# Patient Record
Sex: FEMALE
[De-identification: names, ages, dates, MRNs, and addresses within clinical notes are randomized per-mention and may not be internally consistent; named-entity substitution may affect disease eponyms.]

---

## 2018-09-09 ENCOUNTER — RX ONLY (OUTPATIENT)
Age: 63
Setting detail: RX ONLY
End: 2018-09-09

## 2019-08-20 ENCOUNTER — OFFICE VISIT (OUTPATIENT)
Dept: FAMILY MEDICINE CLINIC | Age: 64
End: 2019-08-20
Payer: COMMERCIAL

## 2019-08-20 VITALS
BODY MASS INDEX: 20.62 KG/M2 | OXYGEN SATURATION: 97 % | HEIGHT: 60 IN | WEIGHT: 105 LBS | DIASTOLIC BLOOD PRESSURE: 60 MMHG | HEART RATE: 110 BPM | SYSTOLIC BLOOD PRESSURE: 136 MMHG

## 2019-08-20 DIAGNOSIS — Z12.39 SCREENING FOR BREAST CANCER: ICD-10-CM

## 2019-08-20 DIAGNOSIS — I10 ESSENTIAL HYPERTENSION: Primary | ICD-10-CM

## 2019-08-20 PROCEDURE — 1123F ACP DISCUSS/DSCN MKR DOCD: CPT | Performed by: FAMILY MEDICINE

## 2019-08-20 PROCEDURE — G8427 DOCREV CUR MEDS BY ELIG CLIN: HCPCS | Performed by: FAMILY MEDICINE

## 2019-08-20 PROCEDURE — 1090F PRES/ABSN URINE INCON ASSESS: CPT | Performed by: FAMILY MEDICINE

## 2019-08-20 PROCEDURE — G8420 CALC BMI NORM PARAMETERS: HCPCS | Performed by: FAMILY MEDICINE

## 2019-08-20 PROCEDURE — G8400 PT W/DXA NO RESULTS DOC: HCPCS | Performed by: FAMILY MEDICINE

## 2019-08-20 PROCEDURE — 4040F PNEUMOC VAC/ADMIN/RCVD: CPT | Performed by: FAMILY MEDICINE

## 2019-08-20 PROCEDURE — 99203 OFFICE O/P NEW LOW 30 MIN: CPT | Performed by: FAMILY MEDICINE

## 2019-08-20 PROCEDURE — 3017F COLORECTAL CA SCREEN DOC REV: CPT | Performed by: FAMILY MEDICINE

## 2019-08-20 PROCEDURE — 1036F TOBACCO NON-USER: CPT | Performed by: FAMILY MEDICINE

## 2019-08-20 RX ORDER — AMLODIPINE BESYLATE 5 MG/1
TABLET ORAL
Refills: 2 | COMMUNITY
Start: 2019-08-12 | End: 2020-01-21 | Stop reason: SDUPTHER

## 2019-08-20 ASSESSMENT — ENCOUNTER SYMPTOMS
DIARRHEA: 0
NAUSEA: 0
CONSTIPATION: 0
VOMITING: 0
EYE REDNESS: 0
SHORTNESS OF BREATH: 0

## 2019-08-20 ASSESSMENT — PATIENT HEALTH QUESTIONNAIRE - PHQ9
2. FEELING DOWN, DEPRESSED OR HOPELESS: 0
SUM OF ALL RESPONSES TO PHQ9 QUESTIONS 1 & 2: 0
1. LITTLE INTEREST OR PLEASURE IN DOING THINGS: 0
SUM OF ALL RESPONSES TO PHQ QUESTIONS 1-9: 0
SUM OF ALL RESPONSES TO PHQ QUESTIONS 1-9: 0

## 2020-01-21 ENCOUNTER — OFFICE VISIT (OUTPATIENT)
Dept: FAMILY MEDICINE CLINIC | Age: 65
End: 2020-01-21
Payer: COMMERCIAL

## 2020-01-21 VITALS
HEIGHT: 60 IN | WEIGHT: 109 LBS | DIASTOLIC BLOOD PRESSURE: 84 MMHG | SYSTOLIC BLOOD PRESSURE: 164 MMHG | BODY MASS INDEX: 21.4 KG/M2 | HEART RATE: 101 BPM | OXYGEN SATURATION: 95 %

## 2020-01-21 PROCEDURE — 4040F PNEUMOC VAC/ADMIN/RCVD: CPT | Performed by: NURSE PRACTITIONER

## 2020-01-21 PROCEDURE — G8427 DOCREV CUR MEDS BY ELIG CLIN: HCPCS | Performed by: NURSE PRACTITIONER

## 2020-01-21 PROCEDURE — G8400 PT W/DXA NO RESULTS DOC: HCPCS | Performed by: NURSE PRACTITIONER

## 2020-01-21 PROCEDURE — G8420 CALC BMI NORM PARAMETERS: HCPCS | Performed by: NURSE PRACTITIONER

## 2020-01-21 PROCEDURE — G8484 FLU IMMUNIZE NO ADMIN: HCPCS | Performed by: NURSE PRACTITIONER

## 2020-01-21 PROCEDURE — 1090F PRES/ABSN URINE INCON ASSESS: CPT | Performed by: NURSE PRACTITIONER

## 2020-01-21 PROCEDURE — 3017F COLORECTAL CA SCREEN DOC REV: CPT | Performed by: NURSE PRACTITIONER

## 2020-01-21 PROCEDURE — 1036F TOBACCO NON-USER: CPT | Performed by: NURSE PRACTITIONER

## 2020-01-21 PROCEDURE — 1123F ACP DISCUSS/DSCN MKR DOCD: CPT | Performed by: NURSE PRACTITIONER

## 2020-01-21 PROCEDURE — 99213 OFFICE O/P EST LOW 20 MIN: CPT | Performed by: NURSE PRACTITIONER

## 2020-01-21 RX ORDER — HYDROCHLOROTHIAZIDE 12.5 MG/1
12.5 TABLET ORAL DAILY
Qty: 30 TABLET | Refills: 0 | Status: SHIPPED | OUTPATIENT
Start: 2020-01-21 | End: 2020-02-12

## 2020-01-21 RX ORDER — AMLODIPINE BESYLATE 5 MG/1
TABLET ORAL
Qty: 60 TABLET | Refills: 0 | Status: SHIPPED | OUTPATIENT
Start: 2020-01-21 | End: 2020-02-14 | Stop reason: SDUPTHER

## 2020-01-21 ASSESSMENT — PATIENT HEALTH QUESTIONNAIRE - PHQ9
SUM OF ALL RESPONSES TO PHQ9 QUESTIONS 1 & 2: 0
1. LITTLE INTEREST OR PLEASURE IN DOING THINGS: 0
SUM OF ALL RESPONSES TO PHQ QUESTIONS 1-9: 0
SUM OF ALL RESPONSES TO PHQ QUESTIONS 1-9: 0
2. FEELING DOWN, DEPRESSED OR HOPELESS: 0

## 2020-01-21 NOTE — PROGRESS NOTES
2020     Chief Complaint   Patient presents with    Hypertension     tired a lot     Bridget Fowler (:  1955) is a 75 y.o. female, here for evaluation of the following medical concerns:    HPI  Hypertension:  Reports uncontrolled blood pressure readings at home over the past week. Systolic as high as 105 but more often 145-155. Checks her blood pressure every day. Takes amlodipine 5 mg at 0730  and 1930. Admits to poor diet over the past 4 months and not exercising much at all. Denies cp, palpitations. Denies medication side effects currently. Diagnosed with htn over 1 year ago. She does admit to be under high stress with current job. No results found for: NA No results found for: BUN No results found for: GLUCOSE   No results found for: K No results found for: CREATININE        Review of Systems   Constitutional: Negative for chills, fatigue and fever. Respiratory: Negative for cough and shortness of breath. Cardiovascular: Negative for chest pain and leg swelling. Gastrointestinal: Negative for diarrhea, nausea and vomiting. Neurological: Negative for dizziness and headaches. All other systems reviewed and are negative.    c  Prior to Visit Medications    Medication Sig Taking? Authorizing Provider   hydrochlorothiazide (HYDRODIURIL) 12.5 MG tablet Take 1 tablet by mouth daily Yes LEELEE Gonzalez CNP   amLODIPine (NORVASC) 5 MG tablet TAKE 1 TABLET BY MOUTH TWICE A DAY Yes LEELEE Gonzalez CNP   Thiamine HCl (VITAMIN B-1) 100 MG tablet Take 100 mg by mouth daily. Yes Historical Provider, MD   Ascorbic Acid (VITAMIN C CR) 1000 MG TBCR Take  by mouth 2 times daily. Yes Historical Provider, MD   fish oil-omega-3 fatty acids 1000 MG capsule Take 2 g by mouth daily.    Yes Historical Provider, MD        Social History     Tobacco Use    Smoking status: Former Smoker     Packs/day: 0.50     Years: 10.00     Pack years: 5.00     Types: Cigarettes

## 2020-01-22 ENCOUNTER — NURSE TRIAGE (OUTPATIENT)
Dept: OTHER | Facility: CLINIC | Age: 65
End: 2020-01-22

## 2020-01-22 PROBLEM — I10 ESSENTIAL HYPERTENSION: Status: ACTIVE | Noted: 2020-01-22

## 2020-01-22 ASSESSMENT — ENCOUNTER SYMPTOMS
NAUSEA: 0
COUGH: 0
DIARRHEA: 0
SHORTNESS OF BREATH: 0
VOMITING: 0

## 2020-01-27 ENCOUNTER — OFFICE VISIT (OUTPATIENT)
Dept: FAMILY MEDICINE CLINIC | Age: 65
End: 2020-01-27
Payer: COMMERCIAL

## 2020-01-27 VITALS
DIASTOLIC BLOOD PRESSURE: 84 MMHG | HEART RATE: 109 BPM | WEIGHT: 108 LBS | OXYGEN SATURATION: 98 % | SYSTOLIC BLOOD PRESSURE: 148 MMHG | BODY MASS INDEX: 21.2 KG/M2 | HEIGHT: 60 IN

## 2020-01-27 PROCEDURE — 3017F COLORECTAL CA SCREEN DOC REV: CPT | Performed by: NURSE PRACTITIONER

## 2020-01-27 PROCEDURE — 1090F PRES/ABSN URINE INCON ASSESS: CPT | Performed by: NURSE PRACTITIONER

## 2020-01-27 PROCEDURE — 4040F PNEUMOC VAC/ADMIN/RCVD: CPT | Performed by: NURSE PRACTITIONER

## 2020-01-27 PROCEDURE — G8420 CALC BMI NORM PARAMETERS: HCPCS | Performed by: NURSE PRACTITIONER

## 2020-01-27 PROCEDURE — G8400 PT W/DXA NO RESULTS DOC: HCPCS | Performed by: NURSE PRACTITIONER

## 2020-01-27 PROCEDURE — 99213 OFFICE O/P EST LOW 20 MIN: CPT | Performed by: NURSE PRACTITIONER

## 2020-01-27 PROCEDURE — 1036F TOBACCO NON-USER: CPT | Performed by: NURSE PRACTITIONER

## 2020-01-27 PROCEDURE — G8427 DOCREV CUR MEDS BY ELIG CLIN: HCPCS | Performed by: NURSE PRACTITIONER

## 2020-01-27 PROCEDURE — 1123F ACP DISCUSS/DSCN MKR DOCD: CPT | Performed by: NURSE PRACTITIONER

## 2020-01-27 PROCEDURE — G8484 FLU IMMUNIZE NO ADMIN: HCPCS | Performed by: NURSE PRACTITIONER

## 2020-01-27 ASSESSMENT — ENCOUNTER SYMPTOMS
TROUBLE SWALLOWING: 0
VOICE CHANGE: 0
STRIDOR: 0
CHEST TIGHTNESS: 0
EYE REDNESS: 0
APNEA: 0
COUGH: 0
ABDOMINAL DISTENTION: 0
EYE PAIN: 0
FACIAL SWELLING: 0
EYE DISCHARGE: 0
PHOTOPHOBIA: 0
RECTAL PAIN: 0
ABDOMINAL PAIN: 0
WHEEZING: 0
CHOKING: 0
COLOR CHANGE: 0
SHORTNESS OF BREATH: 0

## 2020-01-27 NOTE — PATIENT INSTRUCTIONS
numbers will appear on the screen. · Write your numbers in your log book, along with the date and time. Manual blood pressure monitors  · Place the earpieces of a stethoscope in your ears, and place the bell of the stethoscope over the artery, just below the cuff. · Close the valve on the rubber inflating bulb. · Squeeze the bulb rapidly with your opposite hand to inflate the cuff until the dial or column of mercury reads about 30 mm Hg higher than your usual systolic pressure. If you do not know your usual pressure, inflate the cuff to 210 mm Hg or until the pulse at your wrist disappears. · Open the pressure valve just slightly by twisting or pressing the valve on the bulb. · As you watch the pressure slowly fall, note the level on the dial at which you first start to hear a pulsing or tapping sound through the stethoscope. This is your systolic blood pressure. · Continue letting the air out slowly. The sounds will become muffled and will finally disappear. Note the pressure when the sounds completely disappear. This is your diastolic blood pressure. Let out all the remaining air. · Write your numbers in your log book, along with the date and time. What else should you know about the test?  It is more accurate to take the average of several readings made throughout the day than to rely on a single reading. It's normal for blood pressure to go up and down throughout the day. Follow-up care is a key part of your treatment and safety. Be sure to make and go to all appointments, and call your doctor if you are having problems. It's also a good idea to keep a list of the medicines you take. Where can you learn more? Go to [email or web address] and sign in to your [facility] account. Enter C427 in the [employer] box to learn more about \"Home Blood Pressure Test: About This Test.\"     If you do not have an account, please click on the \"Sign Up Now\" link.   Current as of: April 9,

## 2020-01-27 NOTE — PROGRESS NOTES
HPI: Martin Rouse is a 75 y.o. female who presents for follow up on her hypertension. She has been monitoring it at home and getting high readings. She states she is going thru a lot of stress at home. She is working a contract job and her daughter is now not working so she is having to help cover her financial cost.     She states she has been working with Dr. Amanda Edge in getting her blood pressure under control. She has an appointment next month and will be attending that to follow up. I encouraged her to stop in the office any open hours for a nurse visit to get her blood pressure checked. Past Medical History:   Diagnosis Date    Anxiety     Complication of anesthesia     states when had one of her spinals for csect her whole body went numb       Past Surgical History:   Procedure Laterality Date     SECTION      x3    DENTAL SURGERY      DILATION AND CURETTAGE OF UTERUS  9    hysteroscopy,polypectomy    TONSILLECTOMY         Social History     Tobacco Use    Smoking status: Former Smoker     Packs/day: 0.50     Years: 10.00     Pack years: 5.00     Types: Cigarettes     Last attempt to quit: 2008     Years since quittin.0    Smokeless tobacco: Never Used    Tobacco comment: quit [id number]   Substance Use Topics    Alcohol use: Yes     Comment: occ    Drug use: No       History reviewed. No pertinent family history. Review of Systems   Constitutional: Negative for activity change, appetite change, chills, diaphoresis, fever and unexpected weight change. HENT: Negative for congestion, drooling, ear discharge, ear pain, facial swelling, mouth sores, nosebleeds, sneezing, trouble swallowing and voice change. Eyes: Negative for photophobia, pain, discharge, redness and visual disturbance. Respiratory: Negative for apnea, cough, choking, chest tightness, shortness of breath, wheezing and stridor.     Cardiovascular: Negative for chest pain, palpitations and leg swelling. Gastrointestinal: Negative for abdominal distention, abdominal pain and rectal pain. Endocrine: Negative for polydipsia, polyphagia and polyuria. Genitourinary: Negative for difficulty urinating, flank pain and hematuria. Musculoskeletal: Negative for arthralgias and gait problem. Skin: Negative for color change, rash and wound. Allergic/Immunologic: Negative for environmental allergies and immunocompromised state. Neurological: Negative for tremors, seizures, syncope, facial asymmetry, speech difficulty, weakness, light-headedness and numbness. Hematological: Does not bruise/bleed easily. Psychiatric/Behavioral: Negative for agitation, confusion, hallucinations, self-injury and suicidal ideas. Physical Exam  Vitals signs reviewed. Constitutional:       General: She is not in acute distress. Appearance: Normal appearance. She is well-developed and normal weight. She is not diaphoretic. HENT:      Head: Normocephalic and atraumatic. Right Ear: Tympanic membrane, ear canal and external ear normal.      Left Ear: Tympanic membrane, ear canal and external ear normal.      Nose: Nose normal.      Mouth/Throat:      Mouth: Mucous membranes are moist.   Eyes:      General:         Right eye: No discharge. Left eye: No discharge. Conjunctiva/sclera: Conjunctivae normal.      Pupils: Pupils are equal, round, and reactive to light. Neck:      Musculoskeletal: Normal range of motion. Vascular: No JVD. Trachea: No tracheal deviation. Cardiovascular:      Rate and Rhythm: Normal rate and regular rhythm. Heart sounds: Normal heart sounds. No murmur. No friction rub. No gallop. Pulmonary:      Effort: Pulmonary effort is normal. No respiratory distress. Breath sounds: Normal breath sounds. No stridor. No rales. Chest:      Chest wall: No tenderness. Abdominal:      General: Bowel sounds are normal. There is no distension.       Palpations:

## 2020-02-12 RX ORDER — HYDROCHLOROTHIAZIDE 12.5 MG/1
TABLET ORAL
Qty: 30 TABLET | Refills: 0 | Status: SHIPPED | OUTPATIENT
Start: 2020-02-12 | End: 2020-02-14 | Stop reason: SDUPTHER

## 2020-02-14 ENCOUNTER — NURSE ONLY (OUTPATIENT)
Dept: FAMILY MEDICINE CLINIC | Age: 65
End: 2020-02-14

## 2020-02-14 VITALS — DIASTOLIC BLOOD PRESSURE: 80 MMHG | OXYGEN SATURATION: 99 % | SYSTOLIC BLOOD PRESSURE: 182 MMHG | HEART RATE: 105 BPM

## 2020-02-14 RX ORDER — AMLODIPINE BESYLATE 5 MG/1
TABLET ORAL
Qty: 60 TABLET | Refills: 0 | Status: SHIPPED | OUTPATIENT
Start: 2020-02-14 | End: 2020-03-27

## 2020-02-14 RX ORDER — HYDROCHLOROTHIAZIDE 12.5 MG/1
25 TABLET ORAL DAILY
Qty: 60 TABLET | Refills: 0 | Status: SHIPPED | OUTPATIENT
Start: 2020-02-14 | End: 2020-03-03

## 2020-02-25 ENCOUNTER — OFFICE VISIT (OUTPATIENT)
Dept: FAMILY MEDICINE CLINIC | Age: 65
End: 2020-02-25
Payer: COMMERCIAL

## 2020-02-25 VITALS
SYSTOLIC BLOOD PRESSURE: 150 MMHG | DIASTOLIC BLOOD PRESSURE: 80 MMHG | HEART RATE: 100 BPM | OXYGEN SATURATION: 97 % | BODY MASS INDEX: 20.58 KG/M2 | HEIGHT: 61 IN | WEIGHT: 109 LBS

## 2020-02-25 PROCEDURE — 99397 PER PM REEVAL EST PAT 65+ YR: CPT | Performed by: FAMILY MEDICINE

## 2020-02-25 PROCEDURE — G8484 FLU IMMUNIZE NO ADMIN: HCPCS | Performed by: FAMILY MEDICINE

## 2020-02-25 ASSESSMENT — ENCOUNTER SYMPTOMS
VOMITING: 0
NAUSEA: 0
DIARRHEA: 0
ABDOMINAL PAIN: 0
EYE REDNESS: 0
EYE DISCHARGE: 0
COLOR CHANGE: 0
SHORTNESS OF BREATH: 0
CONSTIPATION: 0

## 2020-02-25 NOTE — PATIENT INSTRUCTIONS
Ideally your blood pressure goal should be below 130/80. I usually add or adjust medication if the blood pressure is consistently elevated above 140/90. You can learn more about blood pressure and ways to incorporate a healthy lifestlye to help treat it from the American Heart Association website: www.heart. org under the  Getting Healthy link, and the [address] website: www.nhlbi.nih.gov/hbp    A for Activity  It helps your blood pressure when you exercise regularly. You should try to exercise at least 3 times a week for 20 minutes at a pace that you can comfortably carry on a conversation without being out of breath. B for BMI  The Body Mass Index should be below 30. This is your weight and height measurement. A BMI below 30 is preferred to help lower the risk of Type 2 diabetes, high cholesterol, heart disease, high blood pressure, sleep apnea, gallbladder disease and strokes. C for Control your Salt Intake  Avoid adding salt to your food. Avoid processed food, fast food, and junk food which all has high levels of sodium added. Read labels and get no more than 1500-2300mg of sodium a day. Your fasting blood sugar should be below 100. If it is between 100-125 that is considered high and known as prediabetes, or  impaired glucose tolerance. If your blood sugar is too high, go to diabetes. org for a diabetes prevention diet. The A1c shows your average blood sugar over the previous 3 months. It is sometimes ordered if your blood sugar is elevated  You do not have to be fasting to get it drawn. If it is 5.6 or below it means your blood sugar is in the normal range  If between 5.7 and 6.5 it is impaired glucose tolerance. If it is above 6.5 it is consistent with the diagnosis of  diabetes. Decrease high carb foods if your blood sugar is high.   High carbohydrate foods are:  Breads, muffins, rolls, and bagels  Pasta, rice, corn, and grains  Potatoes, sweet potatoes  Legumes: peas beans muffins, pretzels, crackers, so avoid these options. Also try to steer clear of white rice and pasta. Dairy   Best options: With only 6 to 8 grams of carbohydrates in a serving, plain nonfat [place] yogurt is a healthy and versatile dairy option. You can add berries and enjoy it for dessert or breakfast; you can use it in recipes as a replacement for sour cream, which is high in saturated fat. Skim milk.  Worst options: Avoid all full-fat dairy products and especially packaged chocolate milk, as it also has added sugar. Avoid yogurts with added sugar. Vegetables   Best options: Non-starchy vegetables such as leafy greens, broccoli, cauliflower, asparagus, and carrots are low in carbohydrates and high in fiber and other nutrients, Pam Pedraza says. You can eat non-starchy vegetables in abundance -- half of your plate should be filled with these veggies. If youre craving mashed potatoes, give mashed cauliflower a try.  Worst options: Stick to small portions of starchy vegetables such as corn, potatoes, and peas. These items are nutritious, but should be eaten in moderation. The ADA groups them with grains because of their high carb content. Fruit   Best options: Fresh fruit can conquer your craving for sweets while providing antioxidants and fiber. Berries are a great option because recommended portion sizes are typically generous, which may leave you feeling more satisfied   Worst options: Avoid added sugar by eliminating fruits canned in syrup, and be aware that dried fruits have a very high sugar concentration. Also, fruit juices should be consumed rarely as theyre high in sugar and dont contain the same nutrients as whole fruit. Fats   Best options: Some types of fat actually help protect your heart. Choose the monounsaturated fats found in avocados, almonds, olives, and pecans or the polyunsaturated fats found in walnuts and sunflower oil, which can help to lower bad cholesterol.    Worst options: Saturated fats increase bad cholesterol, so limit butter, cheese, gravy, and fried foods. Keep calories from animal sources of saturated fat to less than 10 percent of your total daily intake. Trans fats are even worse than saturated fats, so avoid them completely. Look for the term hydrogenated on labels of processed foods such as packaged snacks, baked goods, and crackers.  For more help losing weight:         Keep track of what you eat everyday :      Lose it! sophia for the smart phone, or loseit. com or myfitnesspal.com are free online tools you can use to track your daily intake.     The online tools will help you establish your goals for weight loss and how much you should eat a day to accomplish the goal.       A BMI (body mass index) of 25 to 29 is considered overweight, a BMI of 30 and above is considered obese. BMI is found with your vitals on your After Visit Summary. In addition to the above diet info,    Iincrease the amount of fruit and vegetables you eat each day. Ideally you are eating at least 5 servings of fruit and or vegetables a day.     Drink at least 50 ounces of water a day.     Avoid or decrease processed food, fast food, and junk food.     Begin to exercise 15 minutes three times a week doing cardiovascular exercise.     Don't quit. It can take 12 weeks to break old habits before you feel like you are in a new routine. Then you have to live your new lifestyle. [place] Once you are adjusted to your new habits you will not have to keep recording your daily intake, you should have a good idea of what is a normal amount for you to eat each day.     If you are obese, have elevated blood sugar, heart disease or a medical condition that is worsened by excess weight, I can refer you to a [employer] for education, usually covered by insurance.      Ok to go down to 12.5mg of hctz, continue 10mg of amlodipine. Check at home twice a week.  If above 140/90 at rest

## 2020-02-25 NOTE — PROGRESS NOTES
20      Faraz Medrano  See HPI for CC    HPI  Chief Complaint   Patient presents with    Blood Pressure Check    1 Month Follow-Up   taking 5mg amlodipine bid , 25mg  Hctz. Stress makes it wrose. Running normal bp at home, after rest 136/84  When she takes 25mg Full dose of hctz caused palpitations or feelings of heart racing, tolerates the 12.5mg of hctz  Says she tried an b blocker in past and didn't help bp much but dont have the records. Due for a physical and lab including bmp due to chronic htn, and now uncontrolled. Vitals:    20 1113 20 1117   BP: (!) 171/92 (!) 156/100   Site: Left Upper Arm Right Upper Arm   Position: Sitting Sitting   Cuff Size: Medium Adult Medium Adult   Pulse: 100    SpO2: 97%    Weight: 109 lb (49.4 kg)    Height: 5' 1\" (1.549 m)      Body mass index is 20.6 kg/m².      Wt Readings from Last 3 Encounters:   20 109 lb (49.4 kg)   20 108 lb (49 kg)   20 109 lb (49.4 kg)     BP Readings from Last 3 Encounters:   20 (!) 156/100   20 (!) 182/80   20 (!) 148/84            Past Medical History:   Diagnosis Date    Anxiety     Complication of anesthesia     states when had one of her spinals for csect her whole body went numb     Social History     Socioeconomic History    Marital status:      Spouse name: Not on file    Number of children: Not on file    Years of education: Not on file    Highest education level: Not on file   Occupational History    Not on file   Social Needs    Financial resource strain: Not on file    Food insecurity:     Worry: Not on file     Inability: Not on file    Transportation needs:     Medical: Not on file     Non-medical: Not on file   Tobacco Use    Smoking status: Former Smoker     Packs/day: 0.50     Years: 10.00     Pack years: 5.00     Types: Cigarettes     Last attempt to quit: 2008     Years since quittin.1    Smokeless tobacco: Never Used    Tobacco comment: quit [id number]   Substance and Sexual Activity    Alcohol use: Yes     Comment: occ    Drug use: No    Sexual activity: Yes   Lifestyle    Physical activity:     Days per week: Not on file     Minutes per session: Not on file    Stress: Not on file   Relationships    Social connections:     Talks on phone: Not on file     Gets together: Not on file     Attends Zoroastrianism service: Not on file     Active member of club or organization: Not on file     Attends meetings of clubs or organizations: Not on file     Relationship status: Not on file    Intimate partner violence:     Fear of current or ex partner: Not on file     Emotionally abused: Not on file     Physically abused: Not on file     Forced sexual activity: Not on file   Other Topics Concern    Not on file   Social History Narrative    Not on file           Review of Systems   Constitutional: Negative for unexpected weight change. HENT: Negative. Negative for congestion. Eyes: Negative for discharge and redness. Respiratory: Negative for shortness of breath. Cardiovascular: Negative for chest pain, palpitations and leg swelling. Gastrointestinal: Negative for abdominal pain, constipation, diarrhea, nausea and vomiting. Genitourinary: Negative for flank pain. Musculoskeletal: Negative for gait problem. Skin: Negative for color change and pallor. Allergic/Immunologic: Negative for immunocompromised state. Neurological: Negative for facial asymmetry and speech difficulty. Hematological: Does not bruise/bleed easily. Psychiatric/Behavioral: Negative for confusion. The patient is nervous/anxious. All other systems reviewed and are negative.     Current Outpatient Medications   Medication Sig Dispense Refill    amLODIPine (NORVASC) 5 MG tablet TAKE 1 TABLET BY MOUTH TWICE A DAY 60 tablet 0    hydrochlorothiazide (HYDRODIURIL) 12.5 MG tablet Take 2 tablets by mouth daily 60 tablet 0    Thiamine HCl (VITAMIN B-1) 100 MG tablet Take 100 mg by mouth daily.  Ascorbic Acid (VITAMIN C CR) 1000 MG TBCR Take  by mouth 2 times daily.  fish oil-omega-3 fatty acids 1000 MG capsule Take 2 g by mouth daily. No current facility-administered medications for this visit. Vitals:    02/25/20 1117   BP: (!) 156/100   Pulse:    SpO2:          Physical Exam  Physical Exam  Vitals signs and nursing note reviewed. Constitutional:       General: She is not in acute distress. Appearance: Normal appearance. She is well-developed. She is not diaphoretic. HENT:      Head: Normocephalic and atraumatic. Right Ear: External ear normal.      Left Ear: External ear normal.      Nose: Nose normal.   Eyes:      General: No scleral icterus. Right eye: No discharge. Left eye: No discharge. Conjunctiva/sclera: Conjunctivae normal.   Neck:      Musculoskeletal: Normal range of motion and neck supple. Thyroid: No thyromegaly. Cardiovascular:      Rate and Rhythm: Normal rate and regular rhythm. Heart sounds: Normal heart sounds. Pulmonary:      Effort: Pulmonary effort is normal. No respiratory distress. Breath sounds: Normal breath sounds. No stridor. Abdominal:      General: Bowel sounds are normal.      Palpations: Abdomen is soft. There is no mass. Tenderness: There is no abdominal tenderness. Musculoskeletal:         General: No tenderness. Lymphadenopathy:      Cervical: No cervical adenopathy. Skin:     General: Skin is warm and dry. Coloration: Skin is not pale. Findings: No erythema or rash. Neurological:      Mental Status: She is alert and oriented to person, place, and time. Deep Tendon Reflexes: Reflexes are normal and symmetric. Reflexes normal.   Psychiatric:         Mood and Affect: Mood normal.         Behavior: Behavior normal.         Thought Content: Thought content normal.         Judgment: Judgment normal.          Diagnosis Orders   1.

## 2020-02-28 DIAGNOSIS — Z00.00 ANNUAL PHYSICAL EXAM: ICD-10-CM

## 2020-02-28 DIAGNOSIS — I10 UNCONTROLLED HYPERTENSION: ICD-10-CM

## 2020-02-28 LAB
A/G RATIO: 1.6 (ref 1.1–2.2)
ALBUMIN SERPL-MCNC: 4.6 G/DL (ref 3.4–5)
ALP BLD-CCNC: 109 U/L (ref 40–129)
ALT SERPL-CCNC: 46 U/L (ref 10–40)
ANION GAP SERPL CALCULATED.3IONS-SCNC: 19 MMOL/L (ref 3–16)
AST SERPL-CCNC: 23 U/L (ref 15–37)
BILIRUB SERPL-MCNC: 0.4 MG/DL (ref 0–1)
BUN BLDV-MCNC: 16 MG/DL (ref 7–20)
CALCIUM SERPL-MCNC: 10 MG/DL (ref 8.3–10.6)
CHLORIDE BLD-SCNC: 97 MMOL/L (ref 99–110)
CHOLESTEROL, TOTAL: 285 MG/DL (ref 0–199)
CO2: 24 MMOL/L (ref 21–32)
CREAT SERPL-MCNC: 0.6 MG/DL (ref 0.6–1.2)
GFR AFRICAN AMERICAN: >60
GFR NON-AFRICAN AMERICAN: >60
GLOBULIN: 2.8 G/DL
GLUCOSE BLD-MCNC: 134 MG/DL (ref 70–99)
HCT VFR BLD CALC: 43.9 % (ref 36–48)
HDLC SERPL-MCNC: 120 MG/DL (ref 40–60)
HEMOGLOBIN: 14.6 G/DL (ref 12–16)
LDL CHOLESTEROL CALCULATED: 154 MG/DL
MCH RBC QN AUTO: 29.4 PG (ref 26–34)
MCHC RBC AUTO-ENTMCNC: 33.2 G/DL (ref 31–36)
MCV RBC AUTO: 88.3 FL (ref 80–100)
PDW BLD-RTO: 13.8 % (ref 12.4–15.4)
PLATELET # BLD: 462 K/UL (ref 135–450)
PMV BLD AUTO: 7.5 FL (ref 5–10.5)
POTASSIUM SERPL-SCNC: 4.1 MMOL/L (ref 3.5–5.1)
RBC # BLD: 4.97 M/UL (ref 4–5.2)
SODIUM BLD-SCNC: 140 MMOL/L (ref 136–145)
TOTAL PROTEIN: 7.4 G/DL (ref 6.4–8.2)
TRIGL SERPL-MCNC: 54 MG/DL (ref 0–150)
TSH REFLEX FT4: 0.57 UIU/ML (ref 0.27–4.2)
VITAMIN D 25-HYDROXY: 44 NG/ML
VLDLC SERPL CALC-MCNC: 11 MG/DL
WBC # BLD: 6.8 K/UL (ref 4–11)

## 2020-03-02 LAB
ESTIMATED AVERAGE GLUCOSE: 131.2 MG/DL
HBA1C MFR BLD: 6.2 %

## 2020-03-03 RX ORDER — HYDROCHLOROTHIAZIDE 12.5 MG/1
TABLET ORAL
Qty: 60 TABLET | Refills: 0 | Status: SHIPPED | OUTPATIENT
Start: 2020-03-03 | End: 2020-03-12

## 2020-03-03 NOTE — TELEPHONE ENCOUNTER
Last OV 2/25/20  Future Appointments   Date Time Provider Vivienne Vann   3/10/2020 11:20 AM DO ARVIN Crockett

## 2020-03-10 ENCOUNTER — OFFICE VISIT (OUTPATIENT)
Dept: FAMILY MEDICINE CLINIC | Age: 65
End: 2020-03-10
Payer: COMMERCIAL

## 2020-03-10 VITALS
DIASTOLIC BLOOD PRESSURE: 94 MMHG | WEIGHT: 107 LBS | SYSTOLIC BLOOD PRESSURE: 130 MMHG | HEART RATE: 119 BPM | BODY MASS INDEX: 20.2 KG/M2 | OXYGEN SATURATION: 98 % | HEIGHT: 61 IN

## 2020-03-10 PROCEDURE — 1123F ACP DISCUSS/DSCN MKR DOCD: CPT | Performed by: FAMILY MEDICINE

## 2020-03-10 PROCEDURE — G8420 CALC BMI NORM PARAMETERS: HCPCS | Performed by: FAMILY MEDICINE

## 2020-03-10 PROCEDURE — 1090F PRES/ABSN URINE INCON ASSESS: CPT | Performed by: FAMILY MEDICINE

## 2020-03-10 PROCEDURE — 1036F TOBACCO NON-USER: CPT | Performed by: FAMILY MEDICINE

## 2020-03-10 PROCEDURE — 99213 OFFICE O/P EST LOW 20 MIN: CPT | Performed by: FAMILY MEDICINE

## 2020-03-10 PROCEDURE — G8427 DOCREV CUR MEDS BY ELIG CLIN: HCPCS | Performed by: FAMILY MEDICINE

## 2020-03-10 PROCEDURE — 4040F PNEUMOC VAC/ADMIN/RCVD: CPT | Performed by: FAMILY MEDICINE

## 2020-03-10 PROCEDURE — 3017F COLORECTAL CA SCREEN DOC REV: CPT | Performed by: FAMILY MEDICINE

## 2020-03-10 PROCEDURE — G8400 PT W/DXA NO RESULTS DOC: HCPCS | Performed by: FAMILY MEDICINE

## 2020-03-10 PROCEDURE — G8484 FLU IMMUNIZE NO ADMIN: HCPCS | Performed by: FAMILY MEDICINE

## 2020-03-10 ASSESSMENT — ENCOUNTER SYMPTOMS
DIARRHEA: 0
VOMITING: 0
NAUSEA: 0
EYE REDNESS: 0
CONSTIPATION: 0
SHORTNESS OF BREATH: 0

## 2020-03-10 NOTE — PATIENT INSTRUCTIONS
Ideally your blood pressure goal should be below 130/80. I usually add or adjust medication if the blood pressure is consistently elevated above 140/90. Check it once a week for 6 months, return to the office. Sooner if the bps are always above the goal.  You can learn more about blood pressure and ways to incorporate a healthy lifestlye to help treat it from the American Heart Association website: www.heart. org under the  Getting Healthy link, and the [address] website: www.nhlbi.nih.gov/hbp    A for Activity  It helps your blood pressure when you exercise regularly. You should try to exercise at least 3 times a week for 20 minutes at a pace that you can comfortably carry on a conversation without being out of breath. B for BMI  The Body Mass Index should be below 30. This is your weight and height measurement. A BMI below 30 is preferred to help lower the risk of Type 2 diabetes, high cholesterol, heart disease, high blood pressure, sleep apnea, gallbladder disease and strokes. C for Control your Salt Intake  Avoid adding salt to your food. Avoid processed food, fast food, and junk food which all has high levels of sodium added. Read labels and get no more than 1500-2300mg of sodium a day. Your fasting blood sugar should be below 100. If it is between 100-125 that is considered high and known as prediabetes, or  impaired glucose tolerance. If your blood sugar is too high, go to diabetes. org for a diabetes prevention diet. The A1c shows your average blood sugar over the previous 3 months. You do not have to be fasting to get it drawn. If it is 5.6 or below it means your blood sugar is in the normal range  If between 5.7 and 6.5 it is impaired glucose tolerance. If it is above 6.5 it is consistent with the diagnosis of  diabetes. Decrease high carb foods if your blood sugar is high.   High carbohydrate foods are:  Breads muffins rolls and bagels  Past rice corn and grains  Potatoes sweet potatoes  Legumes: peas beans lentils. Milk ( almond milk ok)  Most fruit except berries  Cake cookies pies ice cream candy etc.  Juices, soda, sweetened ice tea  Beer. For your weight, exercise 30 minutes 5 times weekly and improve the types of food you eat:    Protein   Best options: The American Diabetes Association (ADA) recommends lean proteins low in saturated fat, like fish or turkey. Aim for two servings of seafood each week; some fish, like salmon, have the added benefit of containing heart healthy omega-3 fats. For a vegetarian protein source, experiment with the wide variety of beans. Nuts, which are protein and healthy fats powerhouses, are also a choice.  Worst options: Processed deli meats and hot dogs have high amounts of fat along with lots of sodium, which can increase the risk of high blood pressure. Heart attack and stroke are two common complications of diabetes, so keeping blood pressure in check is important. Grains   Best options: When choosing grains, make sure theyre whole. Decrease the amount of foods which contain flour. Whole grains such as wild rice, quinoa, and whole grain breads and cereals contain fiber, which is beneficial for digestive health, but often the grains flour products raise your blood sugar and when your blood sugar returns to normal, it can trigger the desire to eat again.   Worst options: Refined white flour doesnt contain the same health benefits as whole grains. Processed foods made with white flour include breakfast cereals, white bread, and pastries, cookies, muffins, pretzels, crackers, so avoid these options. Also try to steer clear of white rice and pasta. Dairy   Best options: With only 6 to 8 grams of carbohydrates in a serving, plain nonfat [place] yogurt is a healthy and versatile dairy option.  You can add berries and enjoy it for dessert or breakfast; you can use it in recipes as a replacement for sour cream, which is high

## 2020-03-10 NOTE — PROGRESS NOTES
reviewed. Constitutional:       General: She is not in acute distress. Appearance: Normal appearance. She is well-developed. She is not ill-appearing, toxic-appearing or diaphoretic. HENT:      Head: Normocephalic and atraumatic. Eyes:      General: No scleral icterus. Right eye: No discharge. Left eye: No discharge. Conjunctiva/sclera: Conjunctivae normal.   Neck:      Musculoskeletal: Neck supple. No neck rigidity. Cardiovascular:      Rate and Rhythm: Normal rate and regular rhythm. Heart sounds: Normal heart sounds. Pulmonary:      Effort: Pulmonary effort is normal. No respiratory distress. Breath sounds: Normal breath sounds. No stridor. Musculoskeletal:      Right lower leg: No edema. Left lower leg: No edema. Skin:     General: Skin is warm and dry. Coloration: Skin is not pale. Findings: No erythema or rash. Neurological:      Mental Status: She is alert and oriented to person, place, and time. Psychiatric:         Mood and Affect: Mood normal.         Behavior: Behavior normal.         Thought Content: Thought content normal.         Judgment: Judgment normal.        Diagnosis Orders   1. Essential hypertension     2. Impaired glucose tolerance       Adiel Berman was seen today for blood pressure check. Diagnoses and all orders for this visit:    Essential hypertension    Impaired glucose tolerance        Essential hypertension, improving with medication, brought in home readings, they are mostly normal so we will keep the medication the same. An electronic signature was used to authenticate this note. This was dictated. Errors mightbe possible due to dictation.   --Bree Moreno,

## 2020-03-12 RX ORDER — HYDROCHLOROTHIAZIDE 12.5 MG/1
TABLET ORAL
Qty: 30 TABLET | Refills: 5 | Status: SHIPPED | OUTPATIENT
Start: 2020-03-12 | End: 2020-03-27

## 2020-03-27 RX ORDER — HYDROCHLOROTHIAZIDE 12.5 MG/1
TABLET ORAL
Qty: 60 TABLET | Refills: 0 | Status: SHIPPED | OUTPATIENT
Start: 2020-03-27 | End: 2020-05-08 | Stop reason: SDUPTHER

## 2020-03-27 RX ORDER — AMLODIPINE BESYLATE 5 MG/1
TABLET ORAL
Qty: 60 TABLET | Refills: 0 | Status: SHIPPED | OUTPATIENT
Start: 2020-03-27 | End: 2020-04-24

## 2020-05-08 RX ORDER — AMLODIPINE BESYLATE 5 MG/1
TABLET ORAL
Qty: 60 TABLET | Refills: 0 | Status: SHIPPED | OUTPATIENT
Start: 2020-05-08 | End: 2020-11-18 | Stop reason: SDUPTHER

## 2020-05-08 RX ORDER — HYDROCHLOROTHIAZIDE 12.5 MG/1
TABLET ORAL
Qty: 60 TABLET | Refills: 0 | Status: SHIPPED | OUTPATIENT
Start: 2020-05-08 | End: 2020-06-08 | Stop reason: SDUPTHER

## 2020-05-30 ENCOUNTER — NURSE TRIAGE (OUTPATIENT)
Dept: OTHER | Facility: CLINIC | Age: 65
End: 2020-05-30

## 2020-06-09 RX ORDER — HYDROCHLOROTHIAZIDE 12.5 MG/1
TABLET ORAL
Qty: 60 TABLET | Refills: 5 | Status: SHIPPED | OUTPATIENT
Start: 2020-06-09 | End: 2020-07-28

## 2020-07-28 RX ORDER — HYDROCHLOROTHIAZIDE 12.5 MG/1
TABLET ORAL
Qty: 90 TABLET | Refills: 1 | Status: SHIPPED | OUTPATIENT
Start: 2020-07-28 | End: 2020-09-11 | Stop reason: DRUGHIGH

## 2020-09-09 ENCOUNTER — TELEPHONE (OUTPATIENT)
Dept: FAMILY MEDICINE CLINIC | Age: 65
End: 2020-09-09

## 2020-09-09 ASSESSMENT — ENCOUNTER SYMPTOMS
DIARRHEA: 0
VOMITING: 0
SHORTNESS OF BREATH: 0
NAUSEA: 0
CONSTIPATION: 0
EYE REDNESS: 0

## 2020-09-09 NOTE — PROGRESS NOTES
20      Pranav Arias  See HPI for CC    HPI  Chief Complaint   Patient presents with    6 Month Follow-Up       bp recheck, it has been running high. brought in home readings, they are normal mostily normal except 1. Taking norvasc and hctz as prescribed except she is taking the hydrochlorothiazide twice a day. anxity  increased  Taking nothing for it she is fine with the weight is and does not want any thing changes. Doesn't want to get flu shot. Due to personal preference. She feels that it doesn't work. And she doesn't like the ingredients. She is requesting a work note so that she does not have to take it.   We discussed pros and cons and I do recommend taking occasional.  Lab Results   Component Value Date    LABA1C 6.0 2020     Lab Results   Component Value Date    .2 2020       Past Medical History:   Diagnosis Date    Anxiety     Complication of anesthesia     states when had one of her spinals for csect her whole body went numb     Social History     Socioeconomic History    Marital status:      Spouse name: Not on file    Number of children: Not on file    Years of education: Not on file    Highest education level: Not on file   Occupational History    Not on file   Social Needs    Financial resource strain: Not on file    Food insecurity     Worry: Not on file     Inability: Not on file    Transportation needs     Medical: Not on file     Non-medical: Not on file   Tobacco Use    Smoking status: Former Smoker     Packs/day: 0.50     Years: 10.00     Pack years: 5.00     Types: Cigarettes     Last attempt to quit: 2008     Years since quittin.7    Smokeless tobacco: Never Used    Tobacco comment: quit [id number]   Substance and Sexual Activity    Alcohol use: Yes     Comment: occ    Drug use: No    Sexual activity: Yes   Lifestyle    Physical activity     Days per week: Not on file     Minutes per session: Not on file    Stress: Not on file   Relationships    Social connections     Talks on phone: Not on file     Gets together: Not on file     Attends Gnosticist service: Not on file     Active member of club or organization: Not on file     Attends meetings of clubs or organizations: Not on file     Relationship status: Not on file    Intimate partner violence     Fear of current or ex partner: Not on file     Emotionally abused: Not on file     Physically abused: Not on file     Forced sexual activity: Not on file   Other Topics Concern    Not on file   Social History Narrative    Not on file           Review of Systems   Constitutional: Negative for unexpected weight change. HENT: Negative. Eyes: Negative for redness. Respiratory: Negative for shortness of breath. Cardiovascular: Negative for chest pain and leg swelling. Gastrointestinal: Negative for constipation, diarrhea, nausea and vomiting. Skin: Negative for pallor. Allergic/Immunologic: Negative for immunocompromised state. Psychiatric/Behavioral: Negative for confusion. All other systems reviewed and are negative. Current Outpatient Medications   Medication Sig Dispense Refill    hydrochlorothiazide (HYDRODIURIL) 12.5 MG tablet TAKE 1 TABLET BY MOUTH EVERY DAY 90 tablet 1    amLODIPine (NORVASC) 5 MG tablet TAKE ONE TABLET BY MOUTH TWICE A DAY 60 tablet 0    Thiamine HCl (VITAMIN B-1) 100 MG tablet Take 100 mg by mouth daily.  Ascorbic Acid (VITAMIN C CR) 1000 MG TBCR Take  by mouth 2 times daily.  fish oil-omega-3 fatty acids 1000 MG capsule Take 2 g by mouth daily. No current facility-administered medications for this visit. Vitals:    09/11/20 0921   BP: (!) 160/86   Pulse:    Temp:    SpO2:          Physical Exam  Physical Exam  Vitals signs and nursing note reviewed. Constitutional:       General: She is not in acute distress. Appearance: Normal appearance. She is well-developed.  She is not ill-appearing, toxic-appearing or diaphoretic. HENT:      Head: Normocephalic and atraumatic. Eyes:      General: No scleral icterus. Right eye: No discharge. Left eye: No discharge. Conjunctiva/sclera: Conjunctivae normal.   Neck:      Musculoskeletal: Neck supple. No neck rigidity. Cardiovascular:      Rate and Rhythm: Normal rate and regular rhythm. Heart sounds: Normal heart sounds. Pulmonary:      Effort: Pulmonary effort is normal. No respiratory distress. Breath sounds: Normal breath sounds. No stridor. Musculoskeletal:      Right lower leg: No edema. Left lower leg: No edema. Skin:     General: Skin is warm and dry. Coloration: Skin is not pale. Findings: No erythema or rash. Neurological:      Mental Status: She is alert and oriented to person, place, and time. Psychiatric:         Mood and Affect: Mood normal.         Behavior: Behavior normal.         Thought Content: Thought content normal.         Judgment: Judgment normal.          Diagnosis Orders   1. Essential hypertension  hydroCHLOROthiazide (HYDRODIURIL) 12.5 MG tablet   2. Anxiety     3. Impaired glucose tolerance  POCT glycosylated hemoglobin (Hb A1C)     Ramses Peguero was seen today for 6 month follow-up. Diagnoses and all orders for this visit:    Essential hypertension  I updated her records so it would reflect that she is taking her water pill twice a day. She can continue on her same blood pressure medication regimen because it is working by going from her home readings. -     hydroCHLOROthiazide (HYDRODIURIL) 12.5 MG tablet; TAKE 1 TABLET BY MOUTH twice a DAY    Anxiety  Stable  Impaired glucose tolerance stable  -     POCT glycosylated hemoglobin (Hb A1C)  Discussed flu shot actually do recommended and therefore will not let her know that since she should be exempt from taking flu shot. She is taking 2 water pills a day  An electronic signature was used to authenticate this note.   This was dictated. Errors mightbe possible due to dictation.   --Mickey Harm, DO

## 2020-09-09 NOTE — TELEPHONE ENCOUNTER
Pt called with some concerns about her BP this morning it was 170/107 this was after she took her medication. She did stated that she has and feels a little bit of anxiety going on but she is not sure if it is from the BP reading or if the Anxiety is is causing the high BP. The pt stated that she has No other sx or issue. I advised the pt to go tot he ER or urgent care if she starts to have any other sx or issues.

## 2020-09-09 NOTE — TELEPHONE ENCOUNTER
The pt came in for a BP check. Her First BP was 170/85 and 5 minutes later her BP was 131/87. Both were in the Lt arm.  Pt does have an appt on Friday

## 2020-09-09 NOTE — TELEPHONE ENCOUNTER
Pt called back stated that she wasn't having any chest pain, shortness of breath or headache. I made her a nurse visit on 09/09/20 to check her BP. She does have an appointment with you on 09/11/20.     Future Appointments   Date Time Provider Vivienne Vann   9/11/2020  9:20 AM DO ARVIN Felder

## 2020-09-11 ENCOUNTER — OFFICE VISIT (OUTPATIENT)
Dept: FAMILY MEDICINE CLINIC | Age: 65
End: 2020-09-11
Payer: COMMERCIAL

## 2020-09-11 VITALS
BODY MASS INDEX: 21.01 KG/M2 | WEIGHT: 107 LBS | SYSTOLIC BLOOD PRESSURE: 160 MMHG | OXYGEN SATURATION: 98 % | DIASTOLIC BLOOD PRESSURE: 86 MMHG | TEMPERATURE: 97.8 F | HEART RATE: 100 BPM | HEIGHT: 60 IN

## 2020-09-11 LAB — HBA1C MFR BLD: 6 %

## 2020-09-11 PROCEDURE — G8420 CALC BMI NORM PARAMETERS: HCPCS | Performed by: FAMILY MEDICINE

## 2020-09-11 PROCEDURE — 99214 OFFICE O/P EST MOD 30 MIN: CPT | Performed by: FAMILY MEDICINE

## 2020-09-11 PROCEDURE — 83036 HEMOGLOBIN GLYCOSYLATED A1C: CPT | Performed by: FAMILY MEDICINE

## 2020-09-11 PROCEDURE — 1036F TOBACCO NON-USER: CPT | Performed by: FAMILY MEDICINE

## 2020-09-11 PROCEDURE — G8400 PT W/DXA NO RESULTS DOC: HCPCS | Performed by: FAMILY MEDICINE

## 2020-09-11 PROCEDURE — G8427 DOCREV CUR MEDS BY ELIG CLIN: HCPCS | Performed by: FAMILY MEDICINE

## 2020-09-11 PROCEDURE — 1090F PRES/ABSN URINE INCON ASSESS: CPT | Performed by: FAMILY MEDICINE

## 2020-09-11 PROCEDURE — 4040F PNEUMOC VAC/ADMIN/RCVD: CPT | Performed by: FAMILY MEDICINE

## 2020-09-11 PROCEDURE — 1123F ACP DISCUSS/DSCN MKR DOCD: CPT | Performed by: FAMILY MEDICINE

## 2020-09-11 PROCEDURE — 3017F COLORECTAL CA SCREEN DOC REV: CPT | Performed by: FAMILY MEDICINE

## 2020-09-11 RX ORDER — HYDROCHLOROTHIAZIDE 12.5 MG/1
TABLET ORAL
Qty: 90 TABLET | Refills: 1 | Status: SHIPPED | OUTPATIENT
Start: 2020-09-11 | End: 2021-03-23

## 2020-11-17 ENCOUNTER — TELEPHONE (OUTPATIENT)
Dept: FAMILY MEDICINE CLINIC | Age: 65
End: 2020-11-17

## 2020-11-17 RX ORDER — AMLODIPINE BESYLATE 5 MG/1
TABLET ORAL
Qty: 60 TABLET | Refills: 0 | Status: CANCELLED | OUTPATIENT
Start: 2020-11-17

## 2020-11-17 NOTE — TELEPHONE ENCOUNTER
Pt requesting refill     amLODIPine (NORVASC) 5 MG tablet    Pharmacy:  [facility] [address], [address]    Last appt. 9/11/2020    No future appointments.

## 2020-11-18 RX ORDER — AMLODIPINE BESYLATE 5 MG/1
TABLET ORAL
Qty: 60 TABLET | Refills: 5 | Status: SHIPPED | OUTPATIENT
Start: 2020-11-18 | End: 2021-06-17 | Stop reason: SDUPTHER

## 2021-01-06 ENCOUNTER — NURSE TRIAGE (OUTPATIENT)
Dept: OTHER | Facility: CLINIC | Age: 66
End: 2021-01-06

## 2021-01-06 NOTE — TELEPHONE ENCOUNTER
POD 1 Sparkle [place]  High blood pressure  170's/90's  Takes medication, missed one dose two nights ago  Just walked two miles then checked BP    Reason for Disposition   Systolic BP >= 343 OR Diastolic >= 479    Protocols used: HIGH BLOOD PRESSURE-ADULT-OH    Caller triaged, care advice provided, caller verbalized understanding, transferred to [facility] for scheduling. Please do not reply to the triage nurse through this encounter. Any subsequent communication should be directly with the patient.

## 2021-01-07 NOTE — TELEPHONE ENCOUNTER
Answer Assessment - Initial Assessment Questions  1. BLOOD PRESSURE: \"What is the blood pressure? \" \"Did you take at least two measurements 5 minutes apart?\"      170's/90's  2. ONSET: \"When did you take your blood pressure? \"      Kalina Garcia states that she has been taking her blood pressure multiple times today  3. HOW: \"How did you obtain the blood pressure? \" (e.g., visiting nurse, automatic home BP monitor)      Automatic home BP monitor  4. HISTORY: \"Do you have a history of high blood pressure? \"      yes  5. MEDICATIONS: Sofy Castillo you taking any medications for blood pressure? \" \"Have you missed any doses recently? \"      Yes, missed a dose two nights ago  6. OTHER SYMPTOMS: \"Do you have any symptoms? \" (e.g., headache, chest pain, blurred vision, difficulty breathing, weakness)      No other symptoms  7. PREGNANCY: \"Is there any chance you are pregnant? \" \"When was your last menstrual period? \"      no    Protocols used: HIGH BLOOD PRESSURE-ADULT-OH

## 2021-01-11 ENCOUNTER — VIRTUAL VISIT (OUTPATIENT)
Dept: FAMILY MEDICINE CLINIC | Age: 66
End: 2021-01-11
Payer: COMMERCIAL

## 2021-01-11 DIAGNOSIS — I10 ESSENTIAL HYPERTENSION: Primary | ICD-10-CM

## 2021-01-11 DIAGNOSIS — R53.83 OTHER FATIGUE: ICD-10-CM

## 2021-01-11 DIAGNOSIS — F41.9 ANXIETY: ICD-10-CM

## 2021-01-11 PROCEDURE — 99422 OL DIG E/M SVC 11-20 MIN: CPT | Performed by: FAMILY MEDICINE

## 2021-01-11 ASSESSMENT — ENCOUNTER SYMPTOMS
BACK PAIN: 0
COUGH: 0
CHEST TIGHTNESS: 0
STRIDOR: 0
WHEEZING: 0
ABDOMINAL PAIN: 0
CHOKING: 0
SHORTNESS OF BREATH: 0

## 2021-01-11 NOTE — PROGRESS NOTES
Subjective:      Patient ID: Shanti Vasques is a 77 y.o. female. Shanti Vasques is a 77 y.o. female being evaluated by a Virtual Visit (video visit) encounter to address concerns as mentioned above. A caregiver was present when appropriate. Due to this being a TeleHealth encounter (During XQDQF-74 public health emergency), evaluation of the following organ systems was limited: Vitals/Constitutional/EENT/Resp/CV/GI//MS/Neuro/Skin/Heme-Lymph-Imm. Pursuant to the emergency declaration under the [address] [place] [address], [address] [place] [address] and the [employer] Act, this Virtual Visit was conducted with patient's (and/or legal guardian's) consent, to reduce the patient's risk of exposure to COVID-19 and provide necessary medical care. The patient (and/or legal guardian) has also been advised to contact this office for worsening conditions or problems, and seek emergency medical treatment and/or call 911 if deemed necessary. Patient identification was verified at the start of the visit: Yes    Total time spent for this encounter: 15 minutes      Services were provided through a video synchronous discussion virtually to substitute for in-person clinic visit. Patient and provider were located at their individual homes. --Ruma Calvert DO on 1/11/2021 at 4:39 PM    An electronic signature was used to authenticate this note. HPI Bernadette Hood is here with a complaint of anxiety. She is concerned that her blood pressure is fluctuating and she is taking it multiple times through the day. She tells me she is a worrier. Review of Systems   Constitutional: Negative for activity change, appetite change, chills, diaphoresis, fatigue, fever and unexpected weight change. Respiratory: Negative for cough, choking, chest tightness, shortness of breath, wheezing and stridor.

## 2021-04-09 ENCOUNTER — NURSE TRIAGE (OUTPATIENT)
Dept: OTHER | Facility: CLINIC | Age: 66
End: 2021-04-09

## 2021-04-09 NOTE — TELEPHONE ENCOUNTER
Reason for Disposition   MODERATE weakness from poor fluid intake with no improvement after 2 hours of rest and fluids    Answer Assessment - Initial Assessment Questions  1. DESCRIPTION: \"Describe how you are feeling. \"  Pt stated she feels sick and cannot describe. She states she breaks out into sweats. She states she has blisters on her face that she has for two weeks. She is also nauseous this AM. She feels so weak and endorses losing her appetite. She endorses light-headedness, but she states she does not now. She states her mouth is dry. Pt is very tearful and is having a hard time communicating how she is feeling. 2. SEVERITY: \"How bad is it? \"  \"Can you stand and walk? \"    - MILD - Feels weak or tired, but does not interfere with work, school or normal activities    - [facility] to stand and walk; weakness interferes with work, school, or normal activities    - SEVERE - Unable to stand or walk    Pt stated she can get up and walk, but states it doesn't feel normal. Her heart starts pounding, but she doesn't get short of breath. She went for a walk in the park yesterday, but doesn't feel good. It is severe. She went the hospital 4/3.    3. ONSET:  \"When did the weakness begin? \"      A month or more. 4. CAUSE: \"What do you think is causing the weakness? \"  Pt stated it could be high blood pressure, lyme disease, hypokalemia/hyperkalemia, and she stated her liver enzymes are high. 5. MEDICINES: Rosary Roller you recently started a new medicine or had a change in the amount of a medicine? \"  Denies. 6. OTHER SYMPTOMS: \"Do you have any other symptoms? \" (e.g., chest pain, fever, cough, SOB, vomiting, diarrhea, bleeding, other areas of pain)  Denies chest pain, fever, SOB, cough, vomiting, diarrhea, bleeding, and other areas of pain. 7. PREGNANCY: \"Is there any chance you are pregnant? \" \"When was your last menstrual period? \"     N/A    Protocols used: WEAKNESS (GENERALIZED) AND FATIGUE-ADULT-OH    Received call from Joe hernandez at pre-service center [facility]) [address] with Red Flag Complaint. Brief description of triage: Weakness, see above. Triage indicates for patient to be seen by PCP today per 2nd level triage with Derian Alejandro CNP. If there are no available appointments, please go to [facility] [place]. Care advice provided, patient verbalizes understanding; denies any other questions or concerns; instructed to call back for any new or worsening symptoms. Writer provided warm transfer to [place] at [facility] for appointment scheduling. Attention Provider: Thank you for allowing me to participate in the care of your patient. The patient was connected to triage in response to information provided to the E[facility]. Please do not respond through this encounter as the response is not directed to a shared pool.

## 2021-04-12 ENCOUNTER — OFFICE VISIT (OUTPATIENT)
Dept: FAMILY MEDICINE CLINIC | Age: 66
End: 2021-04-12
Payer: COMMERCIAL

## 2021-04-12 VITALS
DIASTOLIC BLOOD PRESSURE: 64 MMHG | SYSTOLIC BLOOD PRESSURE: 122 MMHG | WEIGHT: 105 LBS | HEIGHT: 60 IN | HEART RATE: 118 BPM | TEMPERATURE: 97.3 F | OXYGEN SATURATION: 97 % | BODY MASS INDEX: 20.62 KG/M2

## 2021-04-12 DIAGNOSIS — R42 LIGHT HEADED: ICD-10-CM

## 2021-04-12 DIAGNOSIS — R53.1 WEAKNESS: Primary | ICD-10-CM

## 2021-04-12 DIAGNOSIS — R11.2 NON-INTRACTABLE VOMITING WITH NAUSEA, UNSPECIFIED VOMITING TYPE: ICD-10-CM

## 2021-04-12 DIAGNOSIS — R73.03 PREDIABETES: ICD-10-CM

## 2021-04-12 DIAGNOSIS — I10 ESSENTIAL HYPERTENSION: ICD-10-CM

## 2021-04-12 DIAGNOSIS — R73.02 IMPAIRED GLUCOSE TOLERANCE: ICD-10-CM

## 2021-04-12 DIAGNOSIS — Z13.31 POSITIVE DEPRESSION SCREENING: ICD-10-CM

## 2021-04-12 DIAGNOSIS — E87.6 HYPOKALEMIA: ICD-10-CM

## 2021-04-12 LAB
A/G RATIO: 1.5 (ref 1.1–2.2)
ALBUMIN SERPL-MCNC: 4.9 G/DL (ref 3.4–5)
ALP BLD-CCNC: 116 U/L (ref 40–129)
ALT SERPL-CCNC: 37 U/L (ref 10–40)
ANION GAP SERPL CALCULATED.3IONS-SCNC: 19 MMOL/L (ref 3–16)
AST SERPL-CCNC: 21 U/L (ref 15–37)
BILIRUB SERPL-MCNC: 0.6 MG/DL (ref 0–1)
BILIRUBIN, POC: NORMAL
BLOOD URINE, POC: NORMAL
BUN BLDV-MCNC: 16 MG/DL (ref 7–20)
CALCIUM SERPL-MCNC: 10.1 MG/DL (ref 8.3–10.6)
CHLORIDE BLD-SCNC: 93 MMOL/L (ref 99–110)
CLARITY, POC: NORMAL
CO2: 27 MMOL/L (ref 21–32)
COLOR, POC: NORMAL
CREAT SERPL-MCNC: 0.7 MG/DL (ref 0.6–1.2)
GFR AFRICAN AMERICAN: >60
GFR NON-AFRICAN AMERICAN: >60
GLOBULIN: 3.2 G/DL
GLUCOSE BLD-MCNC: 129 MG/DL (ref 70–99)
GLUCOSE URINE, POC: NORMAL
HBA1C MFR BLD: 6.2 %
HCT VFR BLD CALC: 45 % (ref 36–48)
HEMOGLOBIN: 15.2 G/DL (ref 12–16)
HEPATITIS C ANTIBODY INTERPRETATION: NORMAL
KETONES, POC: NORMAL
LEUKOCYTE EST, POC: NORMAL
MCH RBC QN AUTO: 28.8 PG (ref 26–34)
MCHC RBC AUTO-ENTMCNC: 33.8 G/DL (ref 31–36)
MCV RBC AUTO: 85.4 FL (ref 80–100)
NITRITE, POC: NORMAL
PDW BLD-RTO: 14.7 % (ref 12.4–15.4)
PH, POC: 8
PLATELET # BLD: 509 K/UL (ref 135–450)
PMV BLD AUTO: 7.6 FL (ref 5–10.5)
POTASSIUM SERPL-SCNC: 3.8 MMOL/L (ref 3.5–5.1)
PROTEIN, POC: 300
RBC # BLD: 5.27 M/UL (ref 4–5.2)
SODIUM BLD-SCNC: 139 MMOL/L (ref 136–145)
SPECIFIC GRAVITY, POC: 1.02
TOTAL PROTEIN: 8.1 G/DL (ref 6.4–8.2)
TSH SERPL DL<=0.05 MIU/L-ACNC: 0.54 UIU/ML (ref 0.27–4.2)
UROBILINOGEN, POC: 0.2
WBC # BLD: 7.8 K/UL (ref 4–11)

## 2021-04-12 PROCEDURE — 83036 HEMOGLOBIN GLYCOSYLATED A1C: CPT | Performed by: NURSE PRACTITIONER

## 2021-04-12 PROCEDURE — 36415 COLL VENOUS BLD VENIPUNCTURE: CPT | Performed by: NURSE PRACTITIONER

## 2021-04-12 PROCEDURE — 81003 URINALYSIS AUTO W/O SCOPE: CPT | Performed by: NURSE PRACTITIONER

## 2021-04-12 PROCEDURE — G8431 POS CLIN DEPRES SCRN F/U DOC: HCPCS | Performed by: NURSE PRACTITIONER

## 2021-04-12 PROCEDURE — 1090F PRES/ABSN URINE INCON ASSESS: CPT | Performed by: NURSE PRACTITIONER

## 2021-04-12 PROCEDURE — 3017F COLORECTAL CA SCREEN DOC REV: CPT | Performed by: NURSE PRACTITIONER

## 2021-04-12 PROCEDURE — G8400 PT W/DXA NO RESULTS DOC: HCPCS | Performed by: NURSE PRACTITIONER

## 2021-04-12 PROCEDURE — 4040F PNEUMOC VAC/ADMIN/RCVD: CPT | Performed by: NURSE PRACTITIONER

## 2021-04-12 PROCEDURE — 99213 OFFICE O/P EST LOW 20 MIN: CPT | Performed by: NURSE PRACTITIONER

## 2021-04-12 PROCEDURE — G8420 CALC BMI NORM PARAMETERS: HCPCS | Performed by: NURSE PRACTITIONER

## 2021-04-12 PROCEDURE — G8427 DOCREV CUR MEDS BY ELIG CLIN: HCPCS | Performed by: NURSE PRACTITIONER

## 2021-04-12 PROCEDURE — 1123F ACP DISCUSS/DSCN MKR DOCD: CPT | Performed by: NURSE PRACTITIONER

## 2021-04-12 PROCEDURE — 1036F TOBACCO NON-USER: CPT | Performed by: NURSE PRACTITIONER

## 2021-04-12 SDOH — ECONOMIC STABILITY: INCOME INSECURITY: HOW HARD IS IT FOR YOU TO PAY FOR THE VERY BASICS LIKE FOOD, HOUSING, MEDICAL CARE, AND HEATING?: NOT HARD AT ALL

## 2021-04-12 SDOH — ECONOMIC STABILITY: FOOD INSECURITY: WITHIN THE PAST 12 MONTHS, YOU WORRIED THAT YOUR FOOD WOULD RUN OUT BEFORE YOU GOT MONEY TO BUY MORE.: NEVER TRUE

## 2021-04-12 SDOH — ECONOMIC STABILITY: FOOD INSECURITY: WITHIN THE PAST 12 MONTHS, THE FOOD YOU BOUGHT JUST DIDN'T LAST AND YOU DIDN'T HAVE MONEY TO GET MORE.: NEVER TRUE

## 2021-04-12 SDOH — ECONOMIC STABILITY: TRANSPORTATION INSECURITY
IN THE PAST 12 MONTHS, HAS THE LACK OF TRANSPORTATION KEPT YOU FROM MEDICAL APPOINTMENTS OR FROM GETTING MEDICATIONS?: PATIENT DECLINED

## 2021-04-12 ASSESSMENT — ENCOUNTER SYMPTOMS
ABDOMINAL DISTENTION: 0
SHORTNESS OF BREATH: 0
COUGH: 0
CONSTIPATION: 1
DIARRHEA: 0
NAUSEA: 1
VOMITING: 1
RECTAL PAIN: 0

## 2021-04-12 ASSESSMENT — PATIENT HEALTH QUESTIONNAIRE - PHQ9
4. FEELING TIRED OR HAVING LITTLE ENERGY: 3
2. FEELING DOWN, DEPRESSED OR HOPELESS: 3
SUM OF ALL RESPONSES TO PHQ QUESTIONS 1-9: 15
SUM OF ALL RESPONSES TO PHQ9 QUESTIONS 1 & 2: 5
10. IF YOU CHECKED OFF ANY PROBLEMS, HOW DIFFICULT HAVE THESE PROBLEMS MADE IT FOR YOU TO DO YOUR WORK, TAKE CARE OF THINGS AT HOME, OR GET ALONG WITH OTHER PEOPLE: 0
8. MOVING OR SPEAKING SO SLOWLY THAT OTHER PEOPLE COULD HAVE NOTICED. OR THE OPPOSITE, BEING SO FIGETY OR RESTLESS THAT YOU HAVE BEEN MOVING AROUND A LOT MORE THAN USUAL: 2
1. LITTLE INTEREST OR PLEASURE IN DOING THINGS: 2
SUM OF ALL RESPONSES TO PHQ QUESTIONS 1-9: 15
3. TROUBLE FALLING OR STAYING ASLEEP: 0
5. POOR APPETITE OR OVEREATING: 3

## 2021-04-12 ASSESSMENT — COLUMBIA-SUICIDE SEVERITY RATING SCALE - C-SSRS: 2. HAVE YOU ACTUALLY HAD ANY THOUGHTS OF KILLING YOURSELF?: NO

## 2021-04-13 LAB — URINE CULTURE, ROUTINE: NORMAL

## 2021-04-21 ENCOUNTER — OFFICE VISIT (OUTPATIENT)
Dept: FAMILY MEDICINE CLINIC | Age: 66
End: 2021-04-21
Payer: COMMERCIAL

## 2021-04-21 VITALS
HEART RATE: 123 BPM | BODY MASS INDEX: 20.62 KG/M2 | HEIGHT: 60 IN | SYSTOLIC BLOOD PRESSURE: 158 MMHG | WEIGHT: 105 LBS | OXYGEN SATURATION: 98 % | DIASTOLIC BLOOD PRESSURE: 100 MMHG

## 2021-04-21 DIAGNOSIS — R31.29 MICROSCOPIC HEMATURIA: Primary | ICD-10-CM

## 2021-04-21 LAB
BILIRUBIN, POC: NORMAL
BLOOD URINE, POC: NORMAL
CLARITY, POC: NORMAL
COLOR, POC: NORMAL
GLUCOSE URINE, POC: NORMAL
KETONES, POC: NORMAL
LEUKOCYTE EST, POC: NORMAL
NITRITE, POC: NORMAL
PH, POC: 7
PROTEIN, POC: 300
SPECIFIC GRAVITY, POC: 1.02
UROBILINOGEN, POC: 0.2

## 2021-04-21 PROCEDURE — G8427 DOCREV CUR MEDS BY ELIG CLIN: HCPCS | Performed by: NURSE PRACTITIONER

## 2021-04-21 PROCEDURE — G8420 CALC BMI NORM PARAMETERS: HCPCS | Performed by: NURSE PRACTITIONER

## 2021-04-21 PROCEDURE — 99213 OFFICE O/P EST LOW 20 MIN: CPT | Performed by: NURSE PRACTITIONER

## 2021-04-21 PROCEDURE — 81003 URINALYSIS AUTO W/O SCOPE: CPT | Performed by: NURSE PRACTITIONER

## 2021-04-21 PROCEDURE — 4040F PNEUMOC VAC/ADMIN/RCVD: CPT | Performed by: NURSE PRACTITIONER

## 2021-04-21 PROCEDURE — 1090F PRES/ABSN URINE INCON ASSESS: CPT | Performed by: NURSE PRACTITIONER

## 2021-04-21 PROCEDURE — 3017F COLORECTAL CA SCREEN DOC REV: CPT | Performed by: NURSE PRACTITIONER

## 2021-04-21 PROCEDURE — 1036F TOBACCO NON-USER: CPT | Performed by: NURSE PRACTITIONER

## 2021-04-21 PROCEDURE — G8400 PT W/DXA NO RESULTS DOC: HCPCS | Performed by: NURSE PRACTITIONER

## 2021-04-21 PROCEDURE — 1123F ACP DISCUSS/DSCN MKR DOCD: CPT | Performed by: NURSE PRACTITIONER

## 2021-04-21 ASSESSMENT — ENCOUNTER SYMPTOMS
DIARRHEA: 0
COUGH: 0
SHORTNESS OF BREATH: 0
VOMITING: 1
CONSTIPATION: 1
RECTAL PAIN: 0
NAUSEA: 1
ABDOMINAL DISTENTION: 0

## 2021-04-21 NOTE — PROGRESS NOTES
2021     Chief Complaint   Patient presents with    Hematuria     check urine      David Ramirez (:  1955) is a 74 y.o. female, here for evaluation of the following medical concerns:    HPI  Here for follow up on hematuria. UA today with small amount of blood. Two weeks ago there was large amount. No dysuria. Feeling better in regards to fatigue and dizziness. Tells me she \"cant handle not feeling well\" it is causing \"my anxiety to be through the roof. \" BP elevated today- normally is at appointments she thinks its white coat htn when she checks at home it is normal.     Previous HPI 21  Patient complains of several week history of progressive weakness, nausea, light headedness. Symptoms started approximately one month ago. Complains of feeling fatigued and generalized weakness particularly in the legs. He is able to walk. No pain. One day she felt poorly and went to ED () found to have low potassium 2. 9. was given several days of K+ replacement. Has not had that rechecked since. Has hypertension and taking amlodipine. Stopped taking HCTZ months ago. BP is well controlled. Feels light headed and off balance at times. Had an episode of nausea and vomiting yesterday after drinking green smoothie. Review of Systems   Constitutional: Positive for fatigue. Negative for appetite change, chills, diaphoresis and fever. Respiratory: Negative for cough and shortness of breath. Cardiovascular: Negative for chest pain and leg swelling. Gastrointestinal: Positive for constipation (chronic), nausea and vomiting. Negative for abdominal distention, diarrhea and rectal pain. Genitourinary: Negative for dysuria. Neurological: Positive for dizziness and weakness. Negative for facial asymmetry, light-headedness, numbness and headaches. All other systems reviewed and are negative. Prior to Visit Medications    Medication Sig Taking?  Authorizing Provider   amLODIPine (NORVASC) 5 MG tablet TAKE ONE TABLET BY MOUTH TWICE A DAY Yes Rasel Powers, DO   Thiamine HCl (VITAMIN B-1) 100 MG tablet Take 100 mg by mouth daily. Yes Historical Provider, MD   Ascorbic Acid (VITAMIN C CR) 1000 MG TBCR Take  by mouth 2 times daily. Yes Historical Provider, MD   fish oil-omega-3 fatty acids 1000 MG capsule Take 2 g by mouth daily. Yes Historical Provider, MD        Social History     Tobacco Use    Smoking status: Former Smoker     Packs/day: 0.50     Years: 10.00     Pack years: 5.00     Types: Cigarettes     Quit date: 2008     Years since quittin.3    Smokeless tobacco: Never Used    Tobacco comment: quit [id number]   Substance Use Topics    Alcohol use: Yes     Comment: occ        Vitals:    21 1056 21 1059   BP: (!) 148/72 (!) 158/100   Site: Left Upper Arm Right Upper Arm   Position: Sitting Sitting   Cuff Size: Medium Adult Medium Adult   Pulse: 123    SpO2: 98%    Weight: 105 lb (47.6 kg)    Height: 5' (1.524 m)      Estimated body mass index is 20.51 kg/m² as calculated from the following:    Height as of this encounter: 5' (1.524 m). Weight as of this encounter: 105 lb (47.6 kg). Physical Exam  Vitals signs and nursing note reviewed. Constitutional:       General: She is not in acute distress. Appearance: Normal appearance. She is well-developed. She is not ill-appearing, toxic-appearing or diaphoretic. HENT:      Head: Normocephalic and atraumatic. Eyes:      Extraocular Movements: Extraocular movements intact. Conjunctiva/sclera: Conjunctivae normal.      Pupils: Pupils are equal, round, and reactive to light. Neck:      Musculoskeletal: Full passive range of motion without pain. Thyroid: No thyromegaly. Cardiovascular:      Rate and Rhythm: Regular rhythm. Tachycardia present. Heart sounds: Normal heart sounds, S1 normal and S2 normal. No murmur. No friction rub. No gallop.     Pulmonary:      Effort: Pulmonary effort is normal. No respiratory distress. Breath sounds: Normal breath sounds. No stridor. No wheezing, rhonchi or rales. Skin:     Nails: There is no clubbing. Neurological:      General: No focal deficit present. Mental Status: She is alert and oriented to person, place, and time. Mental status is at baseline. Cranial Nerves: No cranial nerve deficit. Psychiatric:         Mood and Affect: Mood is anxious. Affect is tearful. Speech: Speech normal.       Results for POC orders placed in visit on 04/21/21   POCT Urinalysis No Micro (Auto)   Result Value Ref Range    Color, UA      Clarity, UA      Glucose, UA POC neg     Bilirubin, UA neg     Ketones, UA neg     Spec Grav, UA 1.025     Blood, UA POC small     pH, UA 7.0     Protein, UA      Urobilinogen, UA 0.2     Leukocytes, UA neg     Nitrite, UA neg      ASSESSMENT/PLAN:  1. Microscopic hematuria- offered urology referral she would like to wait to repeat UA again in two weeks and if persistent then would like to see specialist  - POCT Urinalysis No Micro (Auto)      An electronic signature was used to authenticate this note.     --LEELEE Galdamez - CNP on 4/21/2021 at 12:59 PM

## 2021-05-13 ENCOUNTER — OFFICE VISIT (OUTPATIENT)
Dept: FAMILY MEDICINE CLINIC | Age: 66
End: 2021-05-13
Payer: COMMERCIAL

## 2021-05-13 VITALS
OXYGEN SATURATION: 97 % | SYSTOLIC BLOOD PRESSURE: 160 MMHG | HEART RATE: 111 BPM | WEIGHT: 104 LBS | DIASTOLIC BLOOD PRESSURE: 94 MMHG | HEIGHT: 60 IN | BODY MASS INDEX: 20.42 KG/M2

## 2021-05-13 DIAGNOSIS — F41.9 ANXIETY: ICD-10-CM

## 2021-05-13 DIAGNOSIS — I10 ESSENTIAL HYPERTENSION: ICD-10-CM

## 2021-05-13 DIAGNOSIS — R31.29 MICROSCOPIC HEMATURIA: Primary | ICD-10-CM

## 2021-05-13 LAB
BILIRUBIN, POC: NORMAL
BLOOD URINE, POC: NORMAL
CLARITY, POC: NORMAL
COLOR, POC: NORMAL
GLUCOSE URINE, POC: NORMAL
KETONES, POC: NORMAL
LEUKOCYTE EST, POC: NORMAL
NITRITE, POC: NORMAL
PH, POC: 7.5
PROTEIN, POC: 300
SPECIFIC GRAVITY, POC: 1.02
UROBILINOGEN, POC: 0.2

## 2021-05-13 PROCEDURE — 1123F ACP DISCUSS/DSCN MKR DOCD: CPT | Performed by: NURSE PRACTITIONER

## 2021-05-13 PROCEDURE — 4040F PNEUMOC VAC/ADMIN/RCVD: CPT | Performed by: NURSE PRACTITIONER

## 2021-05-13 PROCEDURE — 3017F COLORECTAL CA SCREEN DOC REV: CPT | Performed by: NURSE PRACTITIONER

## 2021-05-13 PROCEDURE — 1036F TOBACCO NON-USER: CPT | Performed by: NURSE PRACTITIONER

## 2021-05-13 PROCEDURE — G8420 CALC BMI NORM PARAMETERS: HCPCS | Performed by: NURSE PRACTITIONER

## 2021-05-13 PROCEDURE — 1090F PRES/ABSN URINE INCON ASSESS: CPT | Performed by: NURSE PRACTITIONER

## 2021-05-13 PROCEDURE — G8427 DOCREV CUR MEDS BY ELIG CLIN: HCPCS | Performed by: NURSE PRACTITIONER

## 2021-05-13 PROCEDURE — 99214 OFFICE O/P EST MOD 30 MIN: CPT | Performed by: NURSE PRACTITIONER

## 2021-05-13 PROCEDURE — 81003 URINALYSIS AUTO W/O SCOPE: CPT | Performed by: NURSE PRACTITIONER

## 2021-05-13 PROCEDURE — G8400 PT W/DXA NO RESULTS DOC: HCPCS | Performed by: NURSE PRACTITIONER

## 2021-05-13 RX ORDER — METOPROLOL SUCCINATE 25 MG/1
25 TABLET, EXTENDED RELEASE ORAL DAILY
Qty: 90 TABLET | Refills: 1 | Status: SHIPPED | OUTPATIENT
Start: 2021-05-13 | End: 2021-05-28 | Stop reason: SDUPTHER

## 2021-05-13 RX ORDER — BUSPIRONE HYDROCHLORIDE 5 MG/1
5 TABLET ORAL 2 TIMES DAILY PRN
Qty: 60 TABLET | Refills: 0 | Status: SHIPPED | OUTPATIENT
Start: 2021-05-13 | End: 2021-05-28 | Stop reason: ALTCHOICE

## 2021-05-13 RX ORDER — POTASSIUM CHLORIDE 750 MG/1
TABLET, FILM COATED, EXTENDED RELEASE ORAL
COMMUNITY
Start: 2021-05-03

## 2021-05-13 ASSESSMENT — ENCOUNTER SYMPTOMS
COUGH: 0
SHORTNESS OF BREATH: 0
RECTAL PAIN: 0
DIARRHEA: 0
CONSTIPATION: 1
NAUSEA: 1
ABDOMINAL DISTENTION: 0
VOMITING: 1

## 2021-05-13 NOTE — PROGRESS NOTES
2021     Chief Complaint   Patient presents with    Hematuria     follow up     David Ramirez (:  1955) is a 74 y.o. female, here for evaluation of the following medical concerns:    HPI    Patient is here for follow up on microscopic hematuria which was first found on 21 when urinalysis was performed due to multiple symptoms. No dysuria, urgency or frequency. There is no evidence of infection. There is also proteinuria present as well. Her blood pressure is high again today, she is under a large amount of stress with her dog dying and she is anxious about her health status. We discussed treating her uncontrolled hypertension and anxiety today and she is open to that. As for the generalized weakness she is feeling better over all. Previous HPI 21  Here for follow up on hematuria. UA today with small amount of blood. Two weeks ago there was large amount. No dysuria. Feeling better in regards to fatigue and dizziness. Tells me she \"cant handle not feeling well\" it is causing \"my anxiety to be through the roof. \" BP elevated today- normally is at appointments she thinks its white coat htn when she checks at home it is normal.      Previous HPI 21  Patient complains of several week history of progressive weakness, nausea, light headedness. Symptoms started approximately one month ago. Complains of feeling fatigued and generalized weakness particularly in the legs. He is able to walk. No pain.  One day she felt poorly and went to ED () found to have low potassium 2. 9. was given several days of K+ replacement. Has not had that rechecked since. Has hypertension and taking amlodipine. Stopped taking HCTZ months ago. BP is well controlled. Feels light headed and off balance at times. Had an episode of nausea and vomiting yesterday after drinking green smoothie. Review of Systems   Constitutional: Positive for fatigue.  Negative for appetite change, chills, diaphoresis and fever. Respiratory: Negative for cough and shortness of breath. Cardiovascular: Negative for chest pain and leg swelling. Gastrointestinal: Positive for constipation (chronic), nausea and vomiting. Negative for abdominal distention, diarrhea and rectal pain. Genitourinary: Negative for dysuria. Neurological: Positive for dizziness and weakness. Negative for facial asymmetry, light-headedness, numbness and headaches. All other systems reviewed and are negative. Prior to Visit Medications    Medication Sig Taking? Authorizing Provider   potassium chloride (KLOR-CON) 10 MEQ extended release tablet  Yes Historical Provider, MD   busPIRone (BUSPAR) 5 MG tablet Take 1 tablet by mouth 2 times daily as needed (anxiety) Yes LEELEE Hopkins CNP   metoprolol succinate (TOPROL XL) 25 MG extended release tablet Take 1 tablet by mouth daily Yes LEELEE Hopkins CNP   amLODIPine (NORVASC) 5 MG tablet TAKE ONE TABLET BY MOUTH TWICE A DAY Yes Letty Luna DO   Thiamine HCl (VITAMIN B-1) 100 MG tablet Take 100 mg by mouth daily. Yes Historical Provider, MD   Ascorbic Acid (VITAMIN C CR) 1000 MG TBCR Take  by mouth 2 times daily. Yes Historical Provider, MD   fish oil-omega-3 fatty acids 1000 MG capsule Take 2 g by mouth daily.    Yes Historical Provider, MD        Social History     Tobacco Use    Smoking status: Former Smoker     Packs/day: 0.50     Years: 10.00     Pack years: 5.00     Types: Cigarettes     Quit date: 2008     Years since quittin.3    Smokeless tobacco: Never Used    Tobacco comment: quit [id number]   Substance Use Topics    Alcohol use: Yes     Comment: occ        Vitals:    21 0903 21 0904   BP: (!) 178/90 (!) 160/94   Site: Left Upper Arm Right Upper Arm   Position: Sitting Sitting   Cuff Size: Medium Adult Medium Adult   Pulse: 111    SpO2: 97%    Weight: 104 lb (47.2 kg)    Height: 5' (1.524 m)      Estimated body mass index is 20.31 kg/m² as calculated from the following:    Height as of this encounter: 5' (1.524 m). Weight as of this encounter: 104 lb (47.2 kg). Physical Exam  Vitals signs and nursing note reviewed. Constitutional:       General: She is not in acute distress. Appearance: Normal appearance. She is well-developed. She is not ill-appearing, toxic-appearing or diaphoretic. HENT:      Head: Normocephalic and atraumatic. Eyes:      Extraocular Movements: Extraocular movements intact. Pupils: Pupils are equal, round, and reactive to light. Cardiovascular:      Rate and Rhythm: Regular rhythm. Tachycardia present. Heart sounds: Normal heart sounds, S1 normal and S2 normal. No murmur. No friction rub. No gallop. Pulmonary:      Effort: Pulmonary effort is normal. No respiratory distress. Breath sounds: Normal breath sounds. Abdominal:      General: Abdomen is flat. Bowel sounds are normal.      Palpations: Abdomen is soft. Tenderness: There is no abdominal tenderness. There is no right CVA tenderness or left CVA tenderness. Neurological:      General: No focal deficit present. Mental Status: She is alert and oriented to person, place, and time. Mental status is at baseline. Cranial Nerves: No cranial nerve deficit. Psychiatric:         Speech: Speech normal.       Results for POC orders placed in visit on 05/13/21   POCT Urinalysis No Micro (Auto)   Result Value Ref Range    Color, UA      Clarity, UA      Glucose, UA POC neg     Bilirubin, UA neg     Ketones, UA neg     Spec Grav, UA 1.020     Blood, UA POC large     pH, UA 7.5     Protein, UA      Urobilinogen, UA 0.2     Leukocytes, UA neg     Nitrite, UA neg          ASSESSMENT/PLAN:  1. Microscopic hematuria- referred to urology  - POCT Urinalysis No Micro (Auto)  - NAY - Pa Echeverria MD, Urology, Merged with [facility]    2. Anxiety- uncontrolled, appears very anxious again today.  Will start Buspar 5 mg twice daily prn,

## 2021-05-26 NOTE — PROGRESS NOTES
Preoperative Consultation      Niraj Parviz  YOB: 1955    Date of Service:  5/28/2021  Chief Complaint   Patient presents with    Pre-op Exam     [facility]     This patient presents to the office today for a preoperative consultation at the request of surgeon, her urologist for a cystoscopy for hematuria  Conservative therapy:did not resolve condition. She has a lot of anxiety and her blood pressure is elevated today. I went ahead and increase her metoprolol from 25 mg to 50 mg and she will call in 1 week with her blood pressure readings. She also has recently been prescribed BuSpar and has not started taking it regularly. I recommend she take 1 tablet twice a day as prescribed. 74 y.o. patient with planned surgery as above. Diagnosis Orders   1. Pre-op evaluation  BUN    CREATININE, SERUM   2. Microscopic hematuria     3. Uncontrolled hypertension       Hortencia Castrejon was seen today for pre-op exam.    Diagnoses and all orders for this visit:    Pre-op evaluation  Will order BUN/Creat today  Microscopic hematuria    Uncontrolled hypertension  Will increase her toprol from 25mg to 50mg today and recheck in 2 weeks      Pre op completed,   Cleared for surgery  Copy of this pre-op consultationwas sent to the surgeon via [facility], when in the [address] [place] [address] Rd system, as well as faxed to surgical pre op dept., and given to the patient to take with them on the day of surgery. Filled out form,see form.      Vitals:    05/28/21 1127 05/28/21 1128 05/28/21 1150   BP: (!) 182/98 (!) 168/102 (!) 154/82   Site: Left Upper Arm Right Upper Arm    Position: Sitting Sitting    Cuff Size: Medium Adult Medium Adult    Pulse: 117     Temp: 97.7 °F (36.5 °C)     SpO2: 97%     Weight: 102 lb (46.3 kg)     Height: 5' (1.524 m)        Wt Readings from Last 2 Encounters:   05/28/21 102 lb (46.3 kg)   05/13/21 104 lb (47.2 kg)     BP Readings from Last 3 Encounters:   05/28/21 (!) 154/82   05/13/21 (!) Tobacco comment: quit [email or web address]   Vaping Use    Vaping Use: Never used   Substance and Sexual Activity    Alcohol use: Yes     Comment: occ    Drug use: No    Sexual activity: Yes   Other Topics Concern    Not on file   Social History Narrative    Not on file     Social Determinants of Health     Financial Resource Strain: Low Risk     Difficulty of Paying Living Expenses: Not hard at all   Food Insecurity: No Food Insecurity    Worried About Running Out of Food in the Last Year: Never true    Jie of Food in the Last Year: Never true   Transportation Needs: Unknown    Lack of Transportation (Medical): Patient refused    Lack of Transportation (Non-Medical): Patient refused   Physical Activity:     Days of Exercise per Week:     Minutes of Exercise per Session:    Stress:     Feeling of Stress :    Social Connections:     Frequency of Communication with Friends and Family:     Frequency of Social Gatherings with Friends and Family:     Attends [employer] Services:     Active Member of Clubs or Organizations:     Attends Club or Organization Meetings:     Marital Status:    Intimate Partner Violence:     Fear of Current or Ex-Partner:     Emotionally Abused:     Physically Abused:     Sexually Abused:        Review of Systems  Review of Systems   Constitutional: Negative for unexpected weight change. HENT: Negative. Eyes: Negative for redness. Respiratory: Negative for shortness of breath. Cardiovascular: Negative for chest pain and leg swelling. Gastrointestinal: Negative for constipation, diarrhea, nausea and vomiting. Genitourinary: Positive for hematuria. Skin: Negative for pallor. Allergic/Immunologic: Negative for immunocompromised state. Neurological: Negative for weakness. Psychiatric/Behavioral: Negative for confusion. The patient is nervous/anxious. All other systems reviewed and are negative. Physical Exam  Vitals and nursing note reviewed. Constitutional:       General: She is not in acute distress. Appearance: Normal appearance. She is well-developed and normal weight. She is not ill-appearing, toxic-appearing or diaphoretic. HENT:      Head: Normocephalic and atraumatic. Eyes:      General: No scleral icterus. Right eye: No discharge. Left eye: No discharge. Conjunctiva/sclera: Conjunctivae normal.   Cardiovascular:      Rate and Rhythm: Normal rate and regular rhythm. Heart sounds: Normal heart sounds. Pulmonary:      Effort: Pulmonary effort is normal. No respiratory distress. Breath sounds: Normal breath sounds. No stridor. Abdominal:      Palpations: Abdomen is soft. There is no mass. Musculoskeletal:      Cervical back: Neck supple. No rigidity. Right lower leg: No edema. Left lower leg: No edema. Skin:     General: Skin is warm and dry. Coloration: Skin is not pale. Findings: No erythema or rash. Neurological:      Mental Status: She is alert and oriented to person, place, and time. Psychiatric:         Mood and Affect: Mood normal.         Behavior: Behavior normal.         Thought Content:  Thought content normal.         Judgment: Judgment normal.

## 2021-05-28 ENCOUNTER — OFFICE VISIT (OUTPATIENT)
Dept: FAMILY MEDICINE CLINIC | Age: 66
End: 2021-05-28
Payer: COMMERCIAL

## 2021-05-28 VITALS
HEIGHT: 60 IN | BODY MASS INDEX: 20.03 KG/M2 | SYSTOLIC BLOOD PRESSURE: 154 MMHG | WEIGHT: 102 LBS | OXYGEN SATURATION: 97 % | DIASTOLIC BLOOD PRESSURE: 82 MMHG | TEMPERATURE: 97.7 F | HEART RATE: 117 BPM

## 2021-05-28 DIAGNOSIS — R31.29 MICROSCOPIC HEMATURIA: ICD-10-CM

## 2021-05-28 DIAGNOSIS — Z01.818 PRE-OP EVALUATION: Primary | ICD-10-CM

## 2021-05-28 DIAGNOSIS — I10 UNCONTROLLED HYPERTENSION: ICD-10-CM

## 2021-05-28 LAB
BUN BLDV-MCNC: 20 MG/DL (ref 7–20)
CREAT SERPL-MCNC: 0.8 MG/DL (ref 0.6–1.2)
GFR AFRICAN AMERICAN: >60
GFR NON-AFRICAN AMERICAN: >60

## 2021-05-28 PROCEDURE — 36415 COLL VENOUS BLD VENIPUNCTURE: CPT | Performed by: FAMILY MEDICINE

## 2021-05-28 PROCEDURE — 3017F COLORECTAL CA SCREEN DOC REV: CPT | Performed by: FAMILY MEDICINE

## 2021-05-28 PROCEDURE — 99214 OFFICE O/P EST MOD 30 MIN: CPT | Performed by: FAMILY MEDICINE

## 2021-05-28 PROCEDURE — G8420 CALC BMI NORM PARAMETERS: HCPCS | Performed by: FAMILY MEDICINE

## 2021-05-28 PROCEDURE — 1036F TOBACCO NON-USER: CPT | Performed by: FAMILY MEDICINE

## 2021-05-28 PROCEDURE — G8400 PT W/DXA NO RESULTS DOC: HCPCS | Performed by: FAMILY MEDICINE

## 2021-05-28 PROCEDURE — 4040F PNEUMOC VAC/ADMIN/RCVD: CPT | Performed by: FAMILY MEDICINE

## 2021-05-28 PROCEDURE — 1123F ACP DISCUSS/DSCN MKR DOCD: CPT | Performed by: FAMILY MEDICINE

## 2021-05-28 PROCEDURE — 1090F PRES/ABSN URINE INCON ASSESS: CPT | Performed by: FAMILY MEDICINE

## 2021-05-28 PROCEDURE — G8427 DOCREV CUR MEDS BY ELIG CLIN: HCPCS | Performed by: FAMILY MEDICINE

## 2021-05-28 RX ORDER — METOPROLOL SUCCINATE 50 MG/1
50 TABLET, EXTENDED RELEASE ORAL DAILY
Qty: 30 TABLET | Refills: 5 | Status: SHIPPED | OUTPATIENT
Start: 2021-05-28 | End: 2021-08-18

## 2021-05-28 ASSESSMENT — ENCOUNTER SYMPTOMS
VOMITING: 0
EYE REDNESS: 0
DIARRHEA: 0
NAUSEA: 0
CONSTIPATION: 0
SHORTNESS OF BREATH: 0

## 2021-06-08 ENCOUNTER — TELEPHONE (OUTPATIENT)
Dept: FAMILY MEDICINE CLINIC | Age: 66
End: 2021-06-08

## 2021-06-08 DIAGNOSIS — F41.9 ANXIETY: ICD-10-CM

## 2021-06-08 RX ORDER — BUSPIRONE HYDROCHLORIDE 5 MG/1
TABLET ORAL
Qty: 60 TABLET | Refills: 0 | Status: SHIPPED | OUTPATIENT
Start: 2021-06-08 | End: 2021-07-02

## 2021-06-17 ENCOUNTER — TELEPHONE (OUTPATIENT)
Dept: FAMILY MEDICINE CLINIC | Age: 66
End: 2021-06-17

## 2021-06-17 ENCOUNTER — VIRTUAL VISIT (OUTPATIENT)
Dept: FAMILY MEDICINE CLINIC | Age: 66
End: 2021-06-17
Payer: COMMERCIAL

## 2021-06-17 DIAGNOSIS — N28.89 RENAL MASS: ICD-10-CM

## 2021-06-17 DIAGNOSIS — K59.00 CONSTIPATION, UNSPECIFIED CONSTIPATION TYPE: ICD-10-CM

## 2021-06-17 DIAGNOSIS — I10 ESSENTIAL HYPERTENSION: ICD-10-CM

## 2021-06-17 DIAGNOSIS — F39 MOOD DISORDER (HCC): Primary | ICD-10-CM

## 2021-06-17 PROCEDURE — G8420 CALC BMI NORM PARAMETERS: HCPCS | Performed by: INTERNAL MEDICINE

## 2021-06-17 PROCEDURE — G8400 PT W/DXA NO RESULTS DOC: HCPCS | Performed by: INTERNAL MEDICINE

## 2021-06-17 PROCEDURE — 99214 OFFICE O/P EST MOD 30 MIN: CPT | Performed by: INTERNAL MEDICINE

## 2021-06-17 PROCEDURE — 4040F PNEUMOC VAC/ADMIN/RCVD: CPT | Performed by: INTERNAL MEDICINE

## 2021-06-17 PROCEDURE — 1090F PRES/ABSN URINE INCON ASSESS: CPT | Performed by: INTERNAL MEDICINE

## 2021-06-17 PROCEDURE — 3017F COLORECTAL CA SCREEN DOC REV: CPT | Performed by: INTERNAL MEDICINE

## 2021-06-17 PROCEDURE — G8427 DOCREV CUR MEDS BY ELIG CLIN: HCPCS | Performed by: INTERNAL MEDICINE

## 2021-06-17 PROCEDURE — 1123F ACP DISCUSS/DSCN MKR DOCD: CPT | Performed by: INTERNAL MEDICINE

## 2021-06-17 PROCEDURE — 1036F TOBACCO NON-USER: CPT | Performed by: INTERNAL MEDICINE

## 2021-06-17 RX ORDER — AMLODIPINE BESYLATE 5 MG/1
TABLET ORAL
Qty: 60 TABLET | Refills: 5 | Status: SHIPPED | OUTPATIENT
Start: 2021-06-17 | End: 2022-02-02

## 2021-06-17 RX ORDER — MAGNESIUM 30 MG
400 TABLET ORAL 2 TIMES DAILY
COMMUNITY
End: 2021-07-13

## 2021-06-17 RX ORDER — SERTRALINE HYDROCHLORIDE 25 MG/1
TABLET, FILM COATED ORAL
Qty: 30 TABLET | Refills: 1 | Status: SHIPPED | OUTPATIENT
Start: 2021-06-17 | End: 2021-07-02

## 2021-06-17 ASSESSMENT — ENCOUNTER SYMPTOMS: COUGH: 0

## 2021-06-17 NOTE — PROGRESS NOTES
Jaziel Nagel (:  1955) is a 74 y.o. female,Established patient, here for evaluation of the following chief complaint(s): Anxiety (patient found out that she has mass on kidney (21) and has to have it removed (doesn't know date yet); BUSPAR isn't helping with anxiety)         ASSESSMENT/PLAN:  1. Essential hypertension    - amLODIPine (NORVASC) 5 MG tablet; TAKE ONE TABLET BY MOUTH TWICE A DAY  Dispense: 60 tablet; Refill: 5    2. Mood disorder (HCC)    - sertraline (ZOLOFT) 25 MG tablet; Take 1 tab daily for anxiety  Dispense: 30 tablet; Refill: 1    3. Constipation, unspecified constipation type  - senna , 2 tabs HS     4. Renal mass  -will be scheduled for surgery      Return if symptoms worsen  or  fail to improve       SUBJECTIVE/OBJECTIVE:  HPI  CC- mood disorder- has been anxious for the past few weeks since she has not been feeling well , had gone to the ER and  Had been work up for hematuria. Started on buspar  For her anxiety  But after she saw the urologist was told she has a renal mass and need to be removed she just became  Totally depressed and felt like the buspar is not helping her anymore. She has no appetitite and has lost a few pounds last 2 months,  very  Anxious and has   trouble sleeping. She was very upset and anxious and literally depressed over the diagnosis of the renal mass. Unfortunately she does not know   Which side and will obtained a copy of the CT done in the urology office. She was told  She need surgery and is just overwhelmed with the whole situation. She did mentioned that she had  Anxiety and depression years ago amd remember taking sertraline for a while   And she eventually did get off the medication. She does not want to take any xanax which she had taken in the past. Advised patient can take sertraline 25 mg daily and the buspar  10 mg twice a day if she needs it. She is acceptable to that and will give it a try.      Constipation- having problems for the past few weeks but not eating and drinking  well  Since she has been feeling weak and tired. Stated  She did have colonoscopy  But that has been over 10 years ago. She promiised to get one after this kidney issue is taken cared of. Hypertension has been better since she has been taking her amlodipine 5 mg BID and metoprolol 50 mg daily. Review of Systems   Constitutional: Positive for appetite change and fatigue. Negative for activity change, chills, diaphoresis and fever. Has lost a few pounds the last 2 months   HENT: Negative for congestion. Respiratory: Negative for cough. Endocrine: Negative for cold intolerance. Genitourinary: Positive for hematuria. Negative for difficulty urinating. Neurological: Positive for weakness. Negative for dizziness and headaches. Psychiatric/Behavioral: Positive for sleep disturbance. The patient is nervous/anxious.         Patient-Reported Vitals 6/17/2021   Patient-Reported Weight 105lb   Patient-Reported Height 5'   Patient-Reported Systolic 424   Patient-Reported Diastolic 87   Patient-Reported Temperature -        Physical Exam    [INSTRUCTIONS:  \"[x]\" Indicates a positive item  \"[]\" Indicates a negative item  -- DELETE ALL ITEMS NOT EXAMINED]    Constitutional: [] Appears well-developed and well-nourished [x] No apparent distress      [x] Abnormal - Has lost weight ,not been eating, trying to drink more fluids    Mental status: [x] Alert and awake  [x] Oriented to person/place/time [x] Able to follow commands    [] Abnormal -     Eyes:   EOM    [x]  Normal    [] Abnormal -   Sclera  []  Normal    [] Abnormal -          Discharge []  None visible   [] Abnormal -     HENT: [x] Normocephalic, atraumatic  [] Abnormal -   [] Mouth/Throat: Mucous membranes are moist    External Ears [x] Normal  [] Abnormal -    Neck: [x] No visualized mass [] Abnormal -     Pulmonary/Chest: [x] Respiratory effort normal   [x] No visualized signs of difficulty breathing or respiratory distress        [] Abnormal -      Musculoskeletal:   [] Normal gait with no signs of ataxia         [] Normal range of motion of neck        [] Abnormal -     Neurological:        [x] No Facial Asymmetry (Cranial nerve 7 motor function) (limited exam due to video visit)          [x] No gaze palsy        [] Abnormal -          Skin:        [x] No significant exanthematous lesions or discoloration noted on facial skin         [] Abnormal -            Psychiatric:       [] Normal Affect [x] Abnormal - nervous and anxious     [x] No Hallucinations    Instruction:  -take sertraline daily  -can take buspar if still very anxious  --take senokot 2 tabs at bedtime  -drink enough fluids  - need colonoscopy  later    Return if symptoms worsen  or  fail to improve    On this date 6/17/2021 I have spent 30 minutes reviewing previous notes, test results and face to face (virtual) with the patient discussing the diagnosis and importance of compliance with the treatment plan as well as documenting on the day of the visit. Jeannine Arita, was evaluated through a synchronous (real-time) audio-video encounter. The patient (or guardian if applicable) is aware that this is a billable service. Verbal consent to proceed has been obtained within the past 12 months. The visit was conducted pursuant to the emergency declaration under the [address] [place] [address] authority and the [employer] and [employer] General Act. Patient identification was verified, and a caregiver was present when appropriate. The patient was located in a state where the provider was credentialed to provide care. An electronic signature was used to authenticate this note.     --Veronique Solis MD

## 2021-06-17 NOTE — TELEPHONE ENCOUNTER
Call the [facility] in [facility] and let them fax us patient's  CT of the abdomen done there.

## 2021-06-26 LAB — PATHOLOGY/CYTOLOGY REPORT: NORMAL

## 2021-07-01 ENCOUNTER — TELEPHONE (OUTPATIENT)
Dept: FAMILY MEDICINE CLINIC | Age: 66
End: 2021-07-01

## 2021-07-01 ENCOUNTER — NURSE TRIAGE (OUTPATIENT)
Dept: OTHER | Facility: CLINIC | Age: 66
End: 2021-07-01

## 2021-07-01 NOTE — TELEPHONE ENCOUNTER
Nurse triage called office and was very concerned about patient who is suffering from extreme depression. Patient has mass in kidney and is going to have surgery to remove kidney. Nurse suggested for her to call a counselor and gave her some info tried to get patient to go to ER and patient refused. Patient has appt with Dr Cristal Dillard tomorrow and stated she would be ok until then. Nurse asked if I would call patient back later to check on her. I told her I would.

## 2021-07-01 NOTE — TELEPHONE ENCOUNTER
----- Message from Lisa Whiting sent at 7/1/2021 10:05 AM EDT -----  Subject: Referral Request    QUESTIONS   Reason for referral request? PT is having a kidney transplant and would   like to talk to someone about getting through it and dealing with her   depression and feelings. PT is having doubts about surgery and her life in   general.   Has the physician seen you for this condition before? No   Preferred Specialist (if applicable)? Do you already have an appointment scheduled? Additional Information for Provider? PT is trying to taker her zoloft as   well but cant seem to take it herself.   ---------------------------------------------------------------------------  --------------  CALL BACK INFO  What is the best way for the office to contact you? OK to leave message on   voicemail  Preferred Call Back Phone Number?  [phone]

## 2021-07-01 NOTE — TELEPHONE ENCOUNTER
Reason for Disposition   Depression and unable to do any of normal activities (e.g., self care, school, work; in [place] [address] to baseline). Answer Assessment - Initial Assessment Questions  1. CONCERN: \"What happened that made you call today? \"      Feels paralyzed and doesn't feel    2. DEPRESSION SYMPTOM SCREENING: \"How are you feeling overall? \" (e.g., decreased energy, increased sleeping or difficulty sleeping, difficulty concentrating, feelings of sadness, guilt, hopelessness, or worthlessness)      Decreased energy wants to sleep all the time, doesn't want to eat, hopeless    3. RISK OF HARM - SUICIDAL IDEATION:  \"Do you ever have thoughts of hurting or killing yourself? \"  (e.g., yes, no, no but preoccupation with thoughts about death)    - INTENT:  \"Do you have thoughts of hurting or killing yourself right NOW? \" (e.g., yes, no, N/A)    - PLAN: \"Do you have a specific plan for how you would do this? \" (e.g., gun, knife, overdose, no plan, N/A)      Doesn't want to live, no plan    4. RISK OF HARM - HOMICIDAL IDEATION:  \"Do you ever have thoughts of hurting or killing someone else? \"  (e.g., yes, no, no but preoccupation with thoughts about death)    - INTENT:  \"Do you have thoughts of hurting or killing someone right NOW? \" (e.g., yes, no, N/A)    - PLAN: \"Do you have a specific plan for how you would do this? \" (e.g., gun, knife, no plan, N/A)       Denies    5. FUNCTIONAL IMPAIRMENT: \"How have things been going for you overall in your life? Have you had any more difficulties than usual doing your normal daily activities? \"  (e.g., better, same, worse; self-care, school, work, interactions)      Difficulty doing normal tasks and job    6. SUPPORT: \"Who is with you now? \" \"Who do you live with?\" \"Do you have family or friends nearby who you can talk to?\"       Daughter is with her, lives with  and daughter and boyfirend    7. THERAPIST: \"Do you have a counselor or therapist? Name? \"      No    8. STRESSORS: \"Has there been any new stress or recent changes in your life? \"      Recent dx of kidney mass and has to have kidney removed    9. DRUG ABUSE/ALCOHOL: \"Do you drink alcohol or use any illegal drugs? \"       Denies    10. OTHER: \"Do you have any other health or medical symptoms right now? \" (e.g., fever)        Denies    11. PREGNANCY: \"Is there any chance you are pregnant? \" \"When was your last menstrual period? \"        N/a    Protocols used: DEPRESSION-ADULT-[place]    Received call from [facility] with Red Flag Complaint. Brief description of triage: as above pt called to set up appointment for depression no suicide plan but states just doesn't want to live,  very tearful and no energy all coming from dx of kidney mass and needs to have kidney removed later this month has support of  and daughter, pt says she is ok now and will see pcp tomorrow tried to talk her into going to er of pcp today and again she said would be ok till tomorrow gave pt a number for [facility] [place]. Told pt to talk with a friend or family or [employer]oroastrian preacher and to let her feelings out and dont keep feeling bottled up. Pt was out driving so unable to send for a well check, so called office and they will call her. I did not transfer pt to office afraid she would hang up so I called the office to have them call her, I called office and spoke with Drea Campos who said she would call the patient    Triage indicates for patient to go to ER    Care advice provided, patient verbalizes understanding; denies any other questions or concerns; instructed to call back for any new or worsening symptoms. .    Attention Provider: Thank you for allowing me to participate in the care of your patient. The patient was connected to triage in response to information provided to the [facility]. Please do not respond through this encounter as the response is not directed to a shared pool.

## 2021-07-02 ENCOUNTER — TELEPHONE (OUTPATIENT)
Dept: FAMILY MEDICINE CLINIC | Age: 66
End: 2021-07-02

## 2021-07-02 ENCOUNTER — OFFICE VISIT (OUTPATIENT)
Dept: FAMILY MEDICINE CLINIC | Age: 66
End: 2021-07-02
Payer: COMMERCIAL

## 2021-07-02 VITALS
HEIGHT: 60 IN | OXYGEN SATURATION: 98 % | WEIGHT: 100 LBS | TEMPERATURE: 97.7 F | SYSTOLIC BLOOD PRESSURE: 168 MMHG | BODY MASS INDEX: 19.63 KG/M2 | DIASTOLIC BLOOD PRESSURE: 94 MMHG | HEART RATE: 115 BPM

## 2021-07-02 DIAGNOSIS — I10 ESSENTIAL HYPERTENSION: Primary | ICD-10-CM

## 2021-07-02 DIAGNOSIS — F41.0 PANIC DISORDER: ICD-10-CM

## 2021-07-02 DIAGNOSIS — F39 MOOD DISORDER (HCC): ICD-10-CM

## 2021-07-02 PROCEDURE — 1123F ACP DISCUSS/DSCN MKR DOCD: CPT | Performed by: NURSE PRACTITIONER

## 2021-07-02 PROCEDURE — G8420 CALC BMI NORM PARAMETERS: HCPCS | Performed by: NURSE PRACTITIONER

## 2021-07-02 PROCEDURE — G8427 DOCREV CUR MEDS BY ELIG CLIN: HCPCS | Performed by: NURSE PRACTITIONER

## 2021-07-02 PROCEDURE — 1090F PRES/ABSN URINE INCON ASSESS: CPT | Performed by: NURSE PRACTITIONER

## 2021-07-02 PROCEDURE — 3017F COLORECTAL CA SCREEN DOC REV: CPT | Performed by: NURSE PRACTITIONER

## 2021-07-02 PROCEDURE — G8400 PT W/DXA NO RESULTS DOC: HCPCS | Performed by: NURSE PRACTITIONER

## 2021-07-02 PROCEDURE — 4040F PNEUMOC VAC/ADMIN/RCVD: CPT | Performed by: NURSE PRACTITIONER

## 2021-07-02 PROCEDURE — 1036F TOBACCO NON-USER: CPT | Performed by: NURSE PRACTITIONER

## 2021-07-02 PROCEDURE — 99214 OFFICE O/P EST MOD 30 MIN: CPT | Performed by: NURSE PRACTITIONER

## 2021-07-02 RX ORDER — ALPRAZOLAM 0.5 MG/1
0.5 TABLET ORAL 2 TIMES DAILY PRN
Qty: 14 TABLET | Refills: 0 | Status: SHIPPED | OUTPATIENT
Start: 2021-07-02 | End: 2021-08-01

## 2021-07-02 RX ORDER — METOPROLOL SUCCINATE 25 MG/1
TABLET, EXTENDED RELEASE ORAL
Qty: 30 TABLET | Refills: 5 | Status: SHIPPED | OUTPATIENT
Start: 2021-07-02 | End: 2021-08-18

## 2021-07-02 ASSESSMENT — ENCOUNTER SYMPTOMS: COUGH: 0

## 2021-07-02 NOTE — PROGRESS NOTES
2021     Chief Complaint   Patient presents with    Depression     Trish Berger (:  1955) is a 74 y.o. female, here for evaluation of the following medical concerns:    HPI  Here for anxiety and depression. Very anxious, tearful, depressed. Has been feeling this way for a while but worsened since found to have renal mass. Will be having nephrectomy later this month. Having trouble eating, feels fatigue, sad, crying, trouble sleeping. Was started on Zoloft 25 mg and tolerating that well although does not feel that it is helping. Taking buspirone which also does not help. Denies SI. overwhelmed with the thought of surgery and potential cancer diagnosis. Her blood pressure is uncontrolled and she is taking amlodipine 5 mg BID and metoprolol XL 50 mg daily. No CP. Very tearful and anxious during visit today. Review of Systems   Constitutional: Positive for appetite change and fatigue. Negative for activity change, chills, diaphoresis and fever. Has lost a few pounds the last 2 months   HENT: Negative for congestion. Respiratory: Negative for cough. Endocrine: Negative for cold intolerance. Genitourinary: Positive for hematuria. Negative for difficulty urinating. Neurological: Positive for weakness. Negative for dizziness and headaches. Psychiatric/Behavioral: Positive for sleep disturbance. The patient is nervous/anxious. Prior to Visit Medications    Medication Sig Taking? Authorizing Provider   ALPRAZoshania Flores) 0.5 MG tablet Take 1 tablet by mouth 2 times daily as needed for Sleep or Anxiety for up to 30 days.  Yes LEELEE Mendoza - CNP   sertraline (ZOLOFT) 50 MG tablet Take 1 tablet by mouth daily Take 1 tab daily for anxiety Yes Debbie Crowe APRN - CNP   metoprolol succinate (TOPROL XL) 25 MG extended release tablet TAKE WITH ONE 50 MG TABLET DAILY FOR TOTAL DAILY DOSE OF 75 MG Yes LEELEE Mendoza - ESTRADA   Cholecalciferol (VITAMIN D3) 125 MCG (5000 UT) TABS Take by mouth Yes Historical Provider, MD   magnesium 30 MG tablet Take 30 mg by mouth 2 times daily Yes Historical Provider, MD   amLODIPine (NORVASC) 5 MG tablet TAKE ONE TABLET BY MOUTH TWICE A DAY Yes Jaiden Gagnon MD   metoprolol succinate (TOPROL XL) 50 MG extended release tablet Take 1 tablet by mouth daily Yes Valentina Kennedy DO   potassium chloride (KLOR-CON) 10 MEQ extended release tablet  Yes Historical Provider, MD   Ascorbic Acid (VITAMIN C CR) 1000 MG TBCR Take  by mouth 2 times daily. Yes Historical Provider, MD   fish oil-omega-3 fatty acids 1000 MG capsule Take 2 g by mouth daily. Yes Historical Provider, MD        Social History     Tobacco Use    Smoking status: Former Smoker     Packs/day: 0.50     Years: 10.00     Pack years: 5.00     Types: Cigarettes     Quit date: 2008     Years since quittin.5    Smokeless tobacco: Never Used    Tobacco comment: quit [id number]   Substance Use Topics    Alcohol use: Yes     Comment: occ        Vitals:    21 1249 21 1257 21 1323   BP: (!) 172/104 (!) 182/104 (!) 168/94   Site: Left Upper Arm Left Upper Arm    Position: Sitting Sitting    Cuff Size: Medium Adult Medium Adult    Pulse: 115     Temp: 97.7 °F (36.5 °C)     SpO2: 98%     Weight: 100 lb (45.4 kg)     Height: 5' (1.524 m)       Estimated body mass index is 19.53 kg/m² as calculated from the following:    Height as of this encounter: 5' (1.524 m). Weight as of this encounter: 100 lb (45.4 kg). Physical Exam  Vitals and nursing note reviewed. Constitutional:       General: She is not in acute distress. Appearance: Normal appearance. She is not ill-appearing, toxic-appearing or diaphoretic. HENT:      Head: Normocephalic and atraumatic. Cardiovascular:      Rate and Rhythm: Regular rhythm. Tachycardia present. Heart sounds: Normal heart sounds. No murmur heard. No friction rub. No gallop.     Pulmonary: Effort: Pulmonary effort is normal. No respiratory distress. Breath sounds: Normal breath sounds. No wheezing. Neurological:      General: No focal deficit present. Mental Status: She is alert and oriented to person, place, and time. Mental status is at baseline. Cranial Nerves: No cranial nerve deficit. ASSESSMENT/PLAN:  1. Essential hypertension- uncontrolled, increase metoprolol to 75 mg daily (she will take one 50 mg tablet  And one 25 mg tablet.)  I wanted to increase her prescription to 100 mg a day however she thought that was too much and wants to try 75 mg first.  - metoprolol succinate (TOPROL XL) 25 MG extended release tablet; TAKE WITH ONE 50 MG TABLET DAILY FOR TOTAL DAILY DOSE OF 75 MG  Dispense: 30 tablet; Refill: 5    2. Mood disorder ([address]- uncontrolled, increase Zoloft 50 mg daily  - sertraline (ZOLOFT) 50 MG tablet; Take 1 tablet by mouth daily Take 1 tab daily for anxiety  Dispense: 30 tablet; Refill: 5    3. Panic disorder- extremely anxious gave short term prescription for alprazolam. Discussed risks, benefits, side effects.   - ALPRAZolam (XANAX) 0.5 MG tablet; Take 1 tablet by mouth 2 times daily as needed for Sleep or Anxiety for up to 30 days. Dispense: 14 tablet; Refill: 0    Return in about 1 week (around 7/9/2021). An electronic signature was used to authenticate this note.     --LEELEE Escalante - CNP on 7/2/2021 at 1:53 PM

## 2021-07-02 NOTE — PATIENT INSTRUCTIONS
· Increase metoprolol 75 mg daily  · Increase Zoloft to 50 mg daily  · Short term prescription of Xanax for high anxiety related to surgery.  May cause sleepiness  · Follow up in 10 days

## 2021-07-13 RX ORDER — ZINC GLUCONATE 50 MG
50 TABLET ORAL DAILY
COMMUNITY

## 2021-07-13 RX ORDER — CALCIUM CARBONATE 300MG(750)
400 TABLET,CHEWABLE ORAL DAILY
COMMUNITY

## 2021-07-13 NOTE — PROGRESS NOTES
Velora Bard    Age 74 y.o.    female    1955    MRN [id number]    7/19/2021  Arrival Time_____________  OR Time____________218 Min     Procedure(s):  ROBOTIC POSSIBLE OPEN RADICAL LEFT NEPHRECTOMY                      General     Surgeon(s):  Ambika De La Torre, MD      DAY ADMIT ___  SDS/OP ___  OUTPT IN BED ___         Phone [phone] (home)    PCP _____________________ Phone_________________ Epic ( ) Epic CE ( ) Appt ________    ADDITIONAL INFO __________________________________ Cardio/Consult _____________    NOTES _____________________________________________________________________    ____________________________________________________________________________    PAT APPT DATE:________ TIME: ________  FAXED QAD: _______  (__) H&P w/ hospitalist  ____________________________________________________________________________    COVID TEST: Date/Location______________        NURSING HISTORY COMPLETE: _______  (__) CBC       (__) W/ DIFF ___________  (__)  ECHO    __________  (__) Hgb A1C    ___________  (__) CHEST X RAY   __________  (__) LIPID PROFILE  ___________  (__) EKG   __________  (__) PT/PTT   ___________  (__) PFT's   __________  (__) BMP   ___________  (__) CAROTIDS  __________  (__) CMP   ___________  (__) VEIN MAPPING  __________  (__) U/A   ___________  (__) HISTORY & PHYSICAL __________  (__) URINE C & S  ___________  (__) CARDIAC CLEARANCE __________  (__) U/A W/ FLEX  ___________  (__) PULM.  CLEARANCE __________  (__) SERUM PREGNANCY ___________  (__) Check Epic DOS orders __________  (__) TYPE & SCREEN ________ repeat ( ) (__)  __________________ __________  (__) ALBUMIN   ___________  (__)  __________________ __________  (__) TRANSFERRIN  ___________  (__)  __________________ __________  (__) LIVER PROFILE  ___________  (__)  __________________ __________  (__) CARBOXY HGB  ___________  (__) URINE PREG DOS __________  (__) NICOTINE & MET.  ___________  (__) BLOOD SUGAR DOS __________  (__) PREALBUMIN  ___________    (__) MRSA NASAL SWAB ___________  (__) BLOOD THINNERS __________  (__) ACE/ ARBS: _____________________    (__) BETABLOCKERS ___________________

## 2021-07-14 ENCOUNTER — HOSPITAL ENCOUNTER (OUTPATIENT)
Dept: PREADMISSION TESTING | Age: 66
Discharge: HOME | End: 2021-07-18
Payer: COMMERCIAL

## 2021-07-14 LAB
ANION GAP SERPL CALCULATED.3IONS-SCNC: 18 MMOL/L (ref 3–16)
BACTERIA: ABNORMAL /HPF
BASOPHILS ABSOLUTE: 0.1 K/UL (ref 0–0.2)
BASOPHILS RELATIVE PERCENT: 0.5 %
BILIRUBIN URINE: NEGATIVE
BLOOD, URINE: ABNORMAL
BUN BLDV-MCNC: 25 MG/DL (ref 7–20)
CALCIUM SERPL-MCNC: 10.4 MG/DL (ref 8.3–10.6)
CHLORIDE BLD-SCNC: 93 MMOL/L (ref 99–110)
CLARITY: CLEAR
CO2: 26 MMOL/L (ref 21–32)
COLOR: YELLOW
CREAT SERPL-MCNC: 1 MG/DL (ref 0.6–1.2)
EKG ATRIAL RATE: 101 BPM
EKG DIAGNOSIS: NORMAL
EKG P AXIS: 73 DEGREES
EKG P-R INTERVAL: 112 MS
EKG Q-T INTERVAL: 378 MS
EKG QRS DURATION: 104 MS
EKG QTC CALCULATION (BAZETT): 490 MS
EKG R AXIS: 100 DEGREES
EKG T AXIS: 60 DEGREES
EKG VENTRICULAR RATE: 101 BPM
EOSINOPHILS ABSOLUTE: 0 K/UL (ref 0–0.6)
EOSINOPHILS RELATIVE PERCENT: 0 %
EPITHELIAL CELLS, UA: ABNORMAL /HPF (ref 0–5)
GFR AFRICAN AMERICAN: >60
GFR NON-AFRICAN AMERICAN: 55
GLUCOSE BLD-MCNC: 153 MG/DL (ref 70–99)
GLUCOSE URINE: 100 MG/DL
HCT VFR BLD CALC: 41.8 % (ref 36–48)
HEMOGLOBIN: 14.5 G/DL (ref 12–16)
HYALINE CASTS: ABNORMAL /LPF (ref 0–2)
KETONES, URINE: NEGATIVE MG/DL
LEUKOCYTE ESTERASE, URINE: NEGATIVE
LYMPHOCYTES ABSOLUTE: 1.1 K/UL (ref 1–5.1)
LYMPHOCYTES RELATIVE PERCENT: 10.8 %
MCH RBC QN AUTO: 29.2 PG (ref 26–34)
MCHC RBC AUTO-ENTMCNC: 34.6 G/DL (ref 31–36)
MCV RBC AUTO: 84.4 FL (ref 80–100)
MICROSCOPIC EXAMINATION: YES
MONOCYTES ABSOLUTE: 0.6 K/UL (ref 0–1.3)
MONOCYTES RELATIVE PERCENT: 5.6 %
NEUTROPHILS ABSOLUTE: 8.2 K/UL (ref 1.7–7.7)
NEUTROPHILS RELATIVE PERCENT: 83.1 %
NITRITE, URINE: NEGATIVE
PDW BLD-RTO: 15.5 % (ref 12.4–15.4)
PH UA: 7.5 (ref 5–8)
PLATELET # BLD: 494 K/UL (ref 135–450)
PMV BLD AUTO: 7.3 FL (ref 5–10.5)
POTASSIUM SERPL-SCNC: 4.3 MMOL/L (ref 3.5–5.1)
PROTEIN UA: 100 MG/DL
RBC # BLD: 4.95 M/UL (ref 4–5.2)
RBC UA: ABNORMAL /HPF (ref 0–4)
SARS-COV-2, PCR: NOT DETECTED
SODIUM BLD-SCNC: 137 MMOL/L (ref 136–145)
SPECIFIC GRAVITY UA: 1.02 (ref 1–1.03)
URINE TYPE: ABNORMAL
UROBILINOGEN, URINE: 0.2 E.U./DL
WBC # BLD: 9.9 K/UL (ref 4–11)
WBC UA: ABNORMAL /HPF (ref 0–5)

## 2021-07-14 PROCEDURE — 36415 COLL VENOUS BLD VENIPUNCTURE: CPT

## 2021-07-14 PROCEDURE — U0005 INFEC AGEN DETEC AMPLI PROBE: HCPCS

## 2021-07-14 PROCEDURE — 87077 CULTURE AEROBIC IDENTIFY: CPT

## 2021-07-14 PROCEDURE — 85025 COMPLETE CBC W/AUTO DIFF WBC: CPT

## 2021-07-14 PROCEDURE — 81001 URINALYSIS AUTO W/SCOPE: CPT

## 2021-07-14 PROCEDURE — U0003 INFECTIOUS AGENT DETECTION BY NUCLEIC ACID (DNA OR RNA); SEVERE ACUTE RESPIRATORY SYNDROME CORONAVIRUS 2 (SARS-COV-2) (CORONAVIRUS DISEASE [COVID-19]), AMPLIFIED PROBE TECHNIQUE, MAKING USE OF HIGH THROUGHPUT TECHNOLOGIES AS DESCRIBED BY CMS-2020-01-R: HCPCS

## 2021-07-14 PROCEDURE — 87086 URINE CULTURE/COLONY COUNT: CPT

## 2021-07-14 PROCEDURE — 80048 BASIC METABOLIC PNL TOTAL CA: CPT

## 2021-07-14 PROCEDURE — 93005 ELECTROCARDIOGRAM TRACING: CPT

## 2021-07-15 ENCOUNTER — OFFICE VISIT (OUTPATIENT)
Dept: FAMILY MEDICINE CLINIC | Age: 66
End: 2021-07-15
Payer: COMMERCIAL

## 2021-07-15 VITALS
WEIGHT: 101 LBS | HEART RATE: 102 BPM | DIASTOLIC BLOOD PRESSURE: 90 MMHG | SYSTOLIC BLOOD PRESSURE: 138 MMHG | RESPIRATION RATE: 16 BRPM | OXYGEN SATURATION: 98 % | TEMPERATURE: 97.3 F | BODY MASS INDEX: 19.73 KG/M2

## 2021-07-15 DIAGNOSIS — I10 ESSENTIAL HYPERTENSION: ICD-10-CM

## 2021-07-15 DIAGNOSIS — F41.9 ANXIETY: ICD-10-CM

## 2021-07-15 DIAGNOSIS — Z01.818 PREOP EXAMINATION: Primary | ICD-10-CM

## 2021-07-15 DIAGNOSIS — R73.03 PREDIABETES: ICD-10-CM

## 2021-07-15 LAB
HBA1C MFR BLD: 6.6 %
ORGANISM: ABNORMAL
URINE CULTURE, ROUTINE: ABNORMAL

## 2021-07-15 PROCEDURE — 1123F ACP DISCUSS/DSCN MKR DOCD: CPT | Performed by: NURSE PRACTITIONER

## 2021-07-15 PROCEDURE — 99214 OFFICE O/P EST MOD 30 MIN: CPT | Performed by: NURSE PRACTITIONER

## 2021-07-15 PROCEDURE — 1036F TOBACCO NON-USER: CPT | Performed by: NURSE PRACTITIONER

## 2021-07-15 PROCEDURE — 1090F PRES/ABSN URINE INCON ASSESS: CPT | Performed by: NURSE PRACTITIONER

## 2021-07-15 PROCEDURE — G8420 CALC BMI NORM PARAMETERS: HCPCS | Performed by: NURSE PRACTITIONER

## 2021-07-15 PROCEDURE — 83036 HEMOGLOBIN GLYCOSYLATED A1C: CPT | Performed by: NURSE PRACTITIONER

## 2021-07-15 PROCEDURE — 4040F PNEUMOC VAC/ADMIN/RCVD: CPT | Performed by: NURSE PRACTITIONER

## 2021-07-15 PROCEDURE — 3017F COLORECTAL CA SCREEN DOC REV: CPT | Performed by: NURSE PRACTITIONER

## 2021-07-15 PROCEDURE — G8427 DOCREV CUR MEDS BY ELIG CLIN: HCPCS | Performed by: NURSE PRACTITIONER

## 2021-07-15 PROCEDURE — G8400 PT W/DXA NO RESULTS DOC: HCPCS | Performed by: NURSE PRACTITIONER

## 2021-07-15 ASSESSMENT — PATIENT HEALTH QUESTIONNAIRE - PHQ9
1. LITTLE INTEREST OR PLEASURE IN DOING THINGS: 0
2. FEELING DOWN, DEPRESSED OR HOPELESS: 1
SUM OF ALL RESPONSES TO PHQ QUESTIONS 1-9: 1
SUM OF ALL RESPONSES TO PHQ9 QUESTIONS 1 & 2: 1
SUM OF ALL RESPONSES TO PHQ QUESTIONS 1-9: 1
SUM OF ALL RESPONSES TO PHQ QUESTIONS 1-9: 1

## 2021-07-15 ASSESSMENT — ENCOUNTER SYMPTOMS: COUGH: 0

## 2021-07-15 NOTE — PROGRESS NOTES
[facility]   Telephone: [phone]  Fax: [phone]  Preoperative History & Physical        DIAGNOSIS:  LEFT RENAL MASS    PROCEDURE:  ROBOTIC POSSIBLE OPEN RADICAL LEFT NEPHRECTOMY      History Obtained From:  patient    HISTORY OF PRESENT ILLNESS:    The patient is a 74 y.o. female with significant past medical history of anxiety, hypertension, kidney mass who presents for preoperative examination at the request of Dr. Tabitha Ceron. She developed hematuria, underwent CT scan which revealed left kidney mass. Her blood pressure has improved this week she is taking metoprolol and amlodipine as prescribed. Still quite anxious about upcoming surgery and diagnosis. Alprazolam helps and is sleeping better. Still has 10 pills left. Past Medical History:   Diagnosis Date    Anxiety     Complication of anesthesia     states when had one of her spinals for csect her whole body went numb    Hypertension     Kidney mass      Past Surgical History:   Procedure Laterality Date     SECTION      x3    DENTAL SURGERY      DILATION AND CURETTAGE OF UTERUS  11    hysteroscopy,polypectomy    TONSILLECTOMY       Current Outpatient Medications   Medication Sig Dispense Refill    Magnesium 400 MG TABS Take 400 mg by mouth daily      VITAMIN A PO Take 25,000 Units by mouth daily      zinc 50 MG TABS tablet Take 50 mg by mouth daily      ALPRAZolam (XANAX) 0.5 MG tablet Take 1 tablet by mouth 2 times daily as needed for Sleep or Anxiety for up to 30 days.  14 tablet 0    sertraline (ZOLOFT) 50 MG tablet Take 1 tablet by mouth daily Take 1 tab daily for anxiety 30 tablet 5    metoprolol succinate (TOPROL XL) 25 MG extended release tablet TAKE WITH ONE 50 MG TABLET DAILY FOR TOTAL DAILY DOSE OF 75 MG 30 tablet 5    Cholecalciferol (VITAMIN D3) 125 MCG (5000 UT) TABS Take by mouth      amLODIPine (NORVASC) 5 MG tablet TAKE ONE TABLET BY MOUTH TWICE A DAY 60 tablet 5    metoprolol succinate (TOPROL XL) 50 MG extended release tablet Take 1 tablet by mouth daily 30 tablet 5    potassium chloride (KLOR-CON) 10 MEQ extended release tablet       Ascorbic Acid (VITAMIN C CR) 1000 MG TBCR Take  by mouth 2 times daily.  fish oil-omega-3 fatty acids 1000 MG capsule Take 2 g by mouth daily. (Patient not taking: Reported on 7/15/2021)       No current facility-administered medications for this visit. Allergies:  Antihistamine [diphenhydramine hcl], Codeine, Epinephrine, and Other  History of allergic reaction to anesthesia:  No  Planned anesthesia: General   Known anesthesia problems: None   Bleeding risk: No recent or remote history of abnormal bleeding  Personal or FH of DVT/PE: No    Patient objection to receiving blood products: No    Social History     Tobacco Use   Smoking Status Former Smoker    Packs/day: 0.50    Years: 10.00    Pack years: 5.00    Types: Cigarettes    Quit date: 2008    Years since quittin.5   Smokeless Tobacco Never Used   Tobacco Comment    quit [id number]     History reviewed. No pertinent family history. REVIEW OF SYSTEMS:    Review of Systems   Constitutional: Positive for appetite change and fatigue. Negative for activity change, chills, diaphoresis and fever. HENT: Negative for congestion. Respiratory: Negative for cough. Endocrine: Negative for cold intolerance. Genitourinary: Positive for hematuria. Negative for difficulty urinating. Neurological: Positive for weakness. Negative for dizziness and headaches. Psychiatric/Behavioral: Positive for sleep disturbance. The patient is nervous/anxious. PHYSICAL EXAM:    BP (!) 138/90 (Site: Right Upper Arm, Position: Supine)   Pulse 102   Temp 97.3 °F (36.3 °C) (Temporal)   Resp 16   Wt 101 lb (45.8 kg)   LMP 2011   SpO2 98%   BMI 19.73 kg/m²   Physical Exam  Vitals and nursing note reviewed. Constitutional:       General: She is not in acute distress. Appearance: Normal appearance. She is normal weight. She is not ill-appearing, toxic-appearing or diaphoretic. HENT:      Head: Normocephalic and atraumatic. Right Ear: External ear normal.   Cardiovascular:      Rate and Rhythm: Normal rate and regular rhythm. Heart sounds: Normal heart sounds. No murmur heard. No friction rub. No gallop. Pulmonary:      Effort: Pulmonary effort is normal. No respiratory distress. Breath sounds: Normal breath sounds. No stridor. No wheezing, rhonchi or rales. Neurological:      General: No focal deficit present. Mental Status: She is alert and oriented to person, place, and time. Mental status is at baseline. Cranial Nerves: No cranial nerve deficit.        DATA:  EKG:  Date:  07/14/2021  I have reviewed EKG with the following interpretation:  Impression:  ST incomplete RBBB    CBC with Differential:    Lab Results   Component Value Date    WBC 9.9 07/14/2021    RBC 4.95 07/14/2021    HGB 14.5 07/14/2021    HCT 41.8 07/14/2021     07/14/2021    MCV 84.4 07/14/2021    MCH 29.2 07/14/2021    MCHC 34.6 07/14/2021    RDW 15.5 07/14/2021    SEGSPCT 77.8 08/05/2011    LYMPHOPCT 10.8 07/14/2021    MONOPCT 5.6 07/14/2021    EOSPCT 0.1 08/05/2011    BASOPCT 0.5 07/14/2021    MONOSABS 0.6 07/14/2021    LYMPHSABS 1.1 07/14/2021    EOSABS 0.0 07/14/2021    BASOSABS 0.1 07/14/2021    DIFFTYPE Auto 08/05/2011     BMP:    Lab Results   Component Value Date     07/14/2021    K 4.3 07/14/2021    CL 93 07/14/2021    CO2 26 07/14/2021    BUN 25 07/14/2021    LABALBU 4.9 04/12/2021    CREATININE 1.0 07/14/2021    CALCIUM 10.4 07/14/2021    GFRAA >60 07/14/2021    LABGLOM 55 07/14/2021    GLUCOSE 153 07/14/2021     U/A:    Lab Results   Component Value Date    NITRITE neg 05/13/2021    COLORU Yellow 07/14/2021    PROTEINU 100 07/14/2021    PHUR 7.5 07/14/2021    WBCUA 3-5 07/14/2021    RBCUA  07/14/2021    BACTERIA 2+ 07/14/2021    CLARITYU Clear 07/14/2021    SPECGRAV 1.020

## 2021-07-15 NOTE — PATIENT INSTRUCTIONS
Eat and drink nothing after midnight the night before the planned procedure. Stop all aspirin, ibuprofen, aleve (tylenol/acetaminophen is ok) for seven days prior to the procedure.

## 2021-07-16 ENCOUNTER — ANESTHESIA EVENT (OUTPATIENT)
Dept: OPERATING ROOM | Age: 66
DRG: 658 | End: 2021-07-16
Payer: COMMERCIAL

## 2021-07-16 NOTE — RESULT ENCOUNTER NOTE
Urine has a small amount of strep but not enough to be considered a UTI. Blood sugar is high if fasting at the time of the pre op testing. Kidney function is lower than usual for her, no anemia, no elevated with count. Platelets stable.  Cleared for surgery

## 2021-07-19 ENCOUNTER — HOSPITAL ENCOUNTER (INPATIENT)
Age: 66
LOS: 2 days | Discharge: HOME | DRG: 658 | End: 2021-07-21
Attending: UROLOGY | Admitting: UROLOGY
Payer: COMMERCIAL

## 2021-07-19 ENCOUNTER — ANESTHESIA (OUTPATIENT)
Dept: OPERATING ROOM | Age: 66
DRG: 658 | End: 2021-07-19
Payer: COMMERCIAL

## 2021-07-19 VITALS
DIASTOLIC BLOOD PRESSURE: 59 MMHG | SYSTOLIC BLOOD PRESSURE: 114 MMHG | TEMPERATURE: 98.1 F | RESPIRATION RATE: 16 BRPM | OXYGEN SATURATION: 100 %

## 2021-07-19 DIAGNOSIS — D41.02 NEOPLASM OF UNCERTAIN BEHAVIOR OF LEFT KIDNEY: ICD-10-CM

## 2021-07-19 DIAGNOSIS — G89.18 POSTOPERATIVE PAIN: Primary | ICD-10-CM

## 2021-07-19 LAB
ABO/RH: NORMAL
ANTIBODY SCREEN: NORMAL
GLUCOSE BLD-MCNC: 173 MG/DL (ref 70–99)
PERFORMED ON: ABNORMAL

## 2021-07-19 PROCEDURE — 88307 TISSUE EXAM BY PATHOLOGIST: CPT

## 2021-07-19 PROCEDURE — 3700000000 HC ANESTHESIA ATTENDED CARE: Performed by: UROLOGY

## 2021-07-19 PROCEDURE — 88342 IMHCHEM/IMCYTCHM 1ST ANTB: CPT

## 2021-07-19 PROCEDURE — 6360000002 HC RX W HCPCS: Performed by: ANESTHESIOLOGY

## 2021-07-19 PROCEDURE — 2500000003 HC RX 250 WO HCPCS: Performed by: NURSE ANESTHETIST, CERTIFIED REGISTERED

## 2021-07-19 PROCEDURE — 6360000002 HC RX W HCPCS: Performed by: UROLOGY

## 2021-07-19 PROCEDURE — 2580000003 HC RX 258: Performed by: ANESTHESIOLOGY

## 2021-07-19 PROCEDURE — 2580000003 HC RX 258: Performed by: NURSE ANESTHETIST, CERTIFIED REGISTERED

## 2021-07-19 PROCEDURE — 86850 RBC ANTIBODY SCREEN: CPT

## 2021-07-19 PROCEDURE — S2900 ROBOTIC SURGICAL SYSTEM: HCPCS | Performed by: UROLOGY

## 2021-07-19 PROCEDURE — 2580000003 HC RX 258: Performed by: UROLOGY

## 2021-07-19 PROCEDURE — 3600000009 HC SURGERY ROBOT BASE: Performed by: UROLOGY

## 2021-07-19 PROCEDURE — 1200000000 HC SEMI PRIVATE

## 2021-07-19 PROCEDURE — 6360000002 HC RX W HCPCS: Performed by: NURSE ANESTHETIST, CERTIFIED REGISTERED

## 2021-07-19 PROCEDURE — 3600000019 HC SURGERY ROBOT ADDTL 15MIN: Performed by: UROLOGY

## 2021-07-19 PROCEDURE — 88341 IMHCHEM/IMCYTCHM EA ADD ANTB: CPT

## 2021-07-19 PROCEDURE — 7100000001 HC PACU RECOVERY - ADDTL 15 MIN: Performed by: UROLOGY

## 2021-07-19 PROCEDURE — 3700000001 HC ADD 15 MINUTES (ANESTHESIA): Performed by: UROLOGY

## 2021-07-19 PROCEDURE — 8E0W4CZ ROBOTIC ASSISTED PROCEDURE OF TRUNK REGION, PERCUTANEOUS ENDOSCOPIC APPROACH: ICD-10-PCS | Performed by: UROLOGY

## 2021-07-19 PROCEDURE — 6370000000 HC RX 637 (ALT 250 FOR IP): Performed by: ANESTHESIOLOGY

## 2021-07-19 PROCEDURE — 86901 BLOOD TYPING SEROLOGIC RH(D): CPT

## 2021-07-19 PROCEDURE — 88313 SPECIAL STAINS GROUP 2: CPT

## 2021-07-19 PROCEDURE — 2709999900 HC NON-CHARGEABLE SUPPLY: Performed by: UROLOGY

## 2021-07-19 PROCEDURE — 7100000000 HC PACU RECOVERY - FIRST 15 MIN: Performed by: UROLOGY

## 2021-07-19 PROCEDURE — 2720000010 HC SURG SUPPLY STERILE: Performed by: UROLOGY

## 2021-07-19 PROCEDURE — 86900 BLOOD TYPING SEROLOGIC ABO: CPT

## 2021-07-19 PROCEDURE — 6370000000 HC RX 637 (ALT 250 FOR IP): Performed by: UROLOGY

## 2021-07-19 PROCEDURE — 0TT14ZZ RESECTION OF LEFT KIDNEY, PERCUTANEOUS ENDOSCOPIC APPROACH: ICD-10-PCS | Performed by: UROLOGY

## 2021-07-19 PROCEDURE — 2500000003 HC RX 250 WO HCPCS: Performed by: UROLOGY

## 2021-07-19 PROCEDURE — 2500000003 HC RX 250 WO HCPCS

## 2021-07-19 DEVICE — Z DUP USE 2641840 CLIP INT L POLYMER LOK LIG HEM O LOK: Type: IMPLANTABLE DEVICE | Status: FUNCTIONAL

## 2021-07-19 RX ORDER — OXYCODONE HYDROCHLORIDE 5 MG/1
5 TABLET ORAL EVERY 4 HOURS PRN
Status: DISCONTINUED | OUTPATIENT
Start: 2021-07-19 | End: 2021-07-20

## 2021-07-19 RX ORDER — ONDANSETRON 2 MG/ML
4 INJECTION INTRAMUSCULAR; INTRAVENOUS PRN
Status: DISCONTINUED | OUTPATIENT
Start: 2021-07-19 | End: 2021-07-19 | Stop reason: HOSPADM

## 2021-07-19 RX ORDER — ONDANSETRON 2 MG/ML
INJECTION INTRAMUSCULAR; INTRAVENOUS PRN
Status: DISCONTINUED | OUTPATIENT
Start: 2021-07-19 | End: 2021-07-19 | Stop reason: SDUPTHER

## 2021-07-19 RX ORDER — ONDANSETRON 2 MG/ML
4 INJECTION INTRAMUSCULAR; INTRAVENOUS EVERY 6 HOURS PRN
Status: DISCONTINUED | OUTPATIENT
Start: 2021-07-19 | End: 2021-07-21 | Stop reason: HOSPADM

## 2021-07-19 RX ORDER — OXYCODONE HYDROCHLORIDE AND ACETAMINOPHEN 5; 325 MG/1; MG/1
1 TABLET ORAL PRN
Status: DISCONTINUED | OUTPATIENT
Start: 2021-07-19 | End: 2021-07-19 | Stop reason: HOSPADM

## 2021-07-19 RX ORDER — LIDOCAINE HYDROCHLORIDE 20 MG/ML
INJECTION, SOLUTION INFILTRATION; PERINEURAL PRN
Status: DISCONTINUED | OUTPATIENT
Start: 2021-07-19 | End: 2021-07-19 | Stop reason: SDUPTHER

## 2021-07-19 RX ORDER — HYDROMORPHONE HCL 110MG/55ML
0.25 PATIENT CONTROLLED ANALGESIA SYRINGE INTRAVENOUS
Status: DISCONTINUED | OUTPATIENT
Start: 2021-07-19 | End: 2021-07-20

## 2021-07-19 RX ORDER — MIDAZOLAM HYDROCHLORIDE 1 MG/ML
2 INJECTION INTRAMUSCULAR; INTRAVENOUS ONCE
Status: COMPLETED | OUTPATIENT
Start: 2021-07-19 | End: 2021-07-19

## 2021-07-19 RX ORDER — SODIUM CHLORIDE 0.9 % (FLUSH) 0.9 %
10 SYRINGE (ML) INJECTION PRN
Status: DISCONTINUED | OUTPATIENT
Start: 2021-07-19 | End: 2021-07-19 | Stop reason: HOSPADM

## 2021-07-19 RX ORDER — ONDANSETRON 4 MG/1
4 TABLET, ORALLY DISINTEGRATING ORAL EVERY 8 HOURS PRN
Status: DISCONTINUED | OUTPATIENT
Start: 2021-07-19 | End: 2021-07-21 | Stop reason: HOSPADM

## 2021-07-19 RX ORDER — AMLODIPINE BESYLATE 5 MG/1
5 TABLET ORAL 2 TIMES DAILY
Status: DISCONTINUED | OUTPATIENT
Start: 2021-07-19 | End: 2021-07-21 | Stop reason: HOSPADM

## 2021-07-19 RX ORDER — PROPOFOL 10 MG/ML
INJECTION, EMULSION INTRAVENOUS PRN
Status: DISCONTINUED | OUTPATIENT
Start: 2021-07-19 | End: 2021-07-19 | Stop reason: SDUPTHER

## 2021-07-19 RX ORDER — OXYCODONE HYDROCHLORIDE AND ACETAMINOPHEN 5; 325 MG/1; MG/1
1 TABLET ORAL EVERY 6 HOURS PRN
Qty: 12 TABLET | Refills: 0 | Status: SHIPPED | OUTPATIENT
Start: 2021-07-19 | End: 2021-07-22

## 2021-07-19 RX ORDER — MANNITOL 20 G/100ML
25 INJECTION, SOLUTION INTRAVENOUS ONCE
Status: DISCONTINUED | OUTPATIENT
Start: 2021-07-19 | End: 2021-07-21 | Stop reason: HOSPADM

## 2021-07-19 RX ORDER — POLYETHYLENE GLYCOL 3350 17 G/17G
17 POWDER, FOR SOLUTION ORAL DAILY PRN
Status: DISCONTINUED | OUTPATIENT
Start: 2021-07-19 | End: 2021-07-21 | Stop reason: HOSPADM

## 2021-07-19 RX ORDER — SODIUM CHLORIDE, SODIUM LACTATE, POTASSIUM CHLORIDE, CALCIUM CHLORIDE 600; 310; 30; 20 MG/100ML; MG/100ML; MG/100ML; MG/100ML
INJECTION, SOLUTION INTRAVENOUS CONTINUOUS
Status: DISCONTINUED | OUTPATIENT
Start: 2021-07-19 | End: 2021-07-19

## 2021-07-19 RX ORDER — HYDRALAZINE HYDROCHLORIDE 20 MG/ML
5 INJECTION INTRAMUSCULAR; INTRAVENOUS EVERY 10 MIN PRN
Status: DISCONTINUED | OUTPATIENT
Start: 2021-07-19 | End: 2021-07-19 | Stop reason: HOSPADM

## 2021-07-19 RX ORDER — METOPROLOL SUCCINATE 25 MG/1
75 TABLET, EXTENDED RELEASE ORAL DAILY
Status: DISCONTINUED | OUTPATIENT
Start: 2021-07-19 | End: 2021-07-21 | Stop reason: HOSPADM

## 2021-07-19 RX ORDER — PHENYLEPHRINE HCL IN 0.9% NACL 1 MG/10 ML
SYRINGE (ML) INTRAVENOUS PRN
Status: DISCONTINUED | OUTPATIENT
Start: 2021-07-19 | End: 2021-07-19 | Stop reason: SDUPTHER

## 2021-07-19 RX ORDER — ACETAMINOPHEN 325 MG/1
650 TABLET ORAL EVERY 6 HOURS
Status: DISCONTINUED | OUTPATIENT
Start: 2021-07-19 | End: 2021-07-21 | Stop reason: HOSPADM

## 2021-07-19 RX ORDER — ROCURONIUM BROMIDE 10 MG/ML
INJECTION, SOLUTION INTRAVENOUS PRN
Status: DISCONTINUED | OUTPATIENT
Start: 2021-07-19 | End: 2021-07-19 | Stop reason: SDUPTHER

## 2021-07-19 RX ORDER — APREPITANT 40 MG/1
40 CAPSULE ORAL ONCE
Status: COMPLETED | OUTPATIENT
Start: 2021-07-19 | End: 2021-07-19

## 2021-07-19 RX ORDER — MIDAZOLAM HYDROCHLORIDE 1 MG/ML
INJECTION INTRAMUSCULAR; INTRAVENOUS PRN
Status: DISCONTINUED | OUTPATIENT
Start: 2021-07-19 | End: 2021-07-19 | Stop reason: SDUPTHER

## 2021-07-19 RX ORDER — MEPERIDINE HYDROCHLORIDE 50 MG/ML
12.5 INJECTION INTRAMUSCULAR; INTRAVENOUS; SUBCUTANEOUS EVERY 5 MIN PRN
Status: DISCONTINUED | OUTPATIENT
Start: 2021-07-19 | End: 2021-07-19 | Stop reason: HOSPADM

## 2021-07-19 RX ORDER — EPHEDRINE SULFATE 50 MG/ML
INJECTION INTRAVENOUS PRN
Status: DISCONTINUED | OUTPATIENT
Start: 2021-07-19 | End: 2021-07-19 | Stop reason: SDUPTHER

## 2021-07-19 RX ORDER — OXYCODONE HYDROCHLORIDE AND ACETAMINOPHEN 5; 325 MG/1; MG/1
2 TABLET ORAL PRN
Status: DISCONTINUED | OUTPATIENT
Start: 2021-07-19 | End: 2021-07-19 | Stop reason: HOSPADM

## 2021-07-19 RX ORDER — SENNA AND DOCUSATE SODIUM 50; 8.6 MG/1; MG/1
2 TABLET, FILM COATED ORAL DAILY
Qty: 20 TABLET | Refills: 1 | Status: SHIPPED | OUTPATIENT
Start: 2021-07-19 | End: 2021-09-01 | Stop reason: ALTCHOICE

## 2021-07-19 RX ORDER — SODIUM CHLORIDE 0.9 % (FLUSH) 0.9 %
10 SYRINGE (ML) INJECTION PRN
Status: DISCONTINUED | OUTPATIENT
Start: 2021-07-19 | End: 2021-07-21 | Stop reason: HOSPADM

## 2021-07-19 RX ORDER — MAGNESIUM HYDROXIDE 1200 MG/15ML
LIQUID ORAL CONTINUOUS PRN
Status: COMPLETED | OUTPATIENT
Start: 2021-07-19 | End: 2021-07-19

## 2021-07-19 RX ORDER — ALPRAZOLAM 0.25 MG/1
0.5 TABLET ORAL 2 TIMES DAILY PRN
Status: DISCONTINUED | OUTPATIENT
Start: 2021-07-19 | End: 2021-07-21 | Stop reason: HOSPADM

## 2021-07-19 RX ORDER — SODIUM CHLORIDE 9 MG/ML
INJECTION, SOLUTION INTRAVENOUS CONTINUOUS PRN
Status: DISCONTINUED | OUTPATIENT
Start: 2021-07-19 | End: 2021-07-19 | Stop reason: SDUPTHER

## 2021-07-19 RX ORDER — MIDAZOLAM HYDROCHLORIDE 1 MG/ML
INJECTION INTRAMUSCULAR; INTRAVENOUS
Status: DISPENSED
Start: 2021-07-19 | End: 2021-07-19

## 2021-07-19 RX ORDER — SENNA AND DOCUSATE SODIUM 50; 8.6 MG/1; MG/1
2 TABLET, FILM COATED ORAL 2 TIMES DAILY
Status: DISCONTINUED | OUTPATIENT
Start: 2021-07-19 | End: 2021-07-21 | Stop reason: HOSPADM

## 2021-07-19 RX ORDER — SODIUM CHLORIDE 9 MG/ML
25 INJECTION, SOLUTION INTRAVENOUS PRN
Status: DISCONTINUED | OUTPATIENT
Start: 2021-07-19 | End: 2021-07-21 | Stop reason: HOSPADM

## 2021-07-19 RX ORDER — OXYCODONE HYDROCHLORIDE 5 MG/1
10 TABLET ORAL EVERY 4 HOURS PRN
Status: DISCONTINUED | OUTPATIENT
Start: 2021-07-19 | End: 2021-07-20

## 2021-07-19 RX ORDER — SODIUM CHLORIDE 0.9 % (FLUSH) 0.9 %
10 SYRINGE (ML) INJECTION EVERY 12 HOURS SCHEDULED
Status: DISCONTINUED | OUTPATIENT
Start: 2021-07-19 | End: 2021-07-19 | Stop reason: HOSPADM

## 2021-07-19 RX ORDER — SCOLOPAMINE TRANSDERMAL SYSTEM 1 MG/1
1 PATCH, EXTENDED RELEASE TRANSDERMAL
Status: DISCONTINUED | OUTPATIENT
Start: 2021-07-19 | End: 2021-07-21 | Stop reason: HOSPADM

## 2021-07-19 RX ORDER — MIDAZOLAM HYDROCHLORIDE 1 MG/ML
1 INJECTION INTRAMUSCULAR; INTRAVENOUS ONCE
Status: DISCONTINUED | OUTPATIENT
Start: 2021-07-19 | End: 2021-07-19

## 2021-07-19 RX ORDER — DIPHENHYDRAMINE HYDROCHLORIDE 50 MG/ML
25 INJECTION INTRAMUSCULAR; INTRAVENOUS EVERY 6 HOURS PRN
Status: DISCONTINUED | OUTPATIENT
Start: 2021-07-19 | End: 2021-07-21 | Stop reason: HOSPADM

## 2021-07-19 RX ORDER — LABETALOL HYDROCHLORIDE 5 MG/ML
5 INJECTION, SOLUTION INTRAVENOUS EVERY 10 MIN PRN
Status: DISCONTINUED | OUTPATIENT
Start: 2021-07-19 | End: 2021-07-19 | Stop reason: HOSPADM

## 2021-07-19 RX ORDER — HYDROMORPHONE HCL 110MG/55ML
0.5 PATIENT CONTROLLED ANALGESIA SYRINGE INTRAVENOUS
Status: DISCONTINUED | OUTPATIENT
Start: 2021-07-19 | End: 2021-07-20

## 2021-07-19 RX ORDER — LIDOCAINE HYDROCHLORIDE 10 MG/ML
1 INJECTION, SOLUTION EPIDURAL; INFILTRATION; INTRACAUDAL; PERINEURAL
Status: DISCONTINUED | OUTPATIENT
Start: 2021-07-19 | End: 2021-07-19 | Stop reason: HOSPADM

## 2021-07-19 RX ORDER — SODIUM CHLORIDE 0.9 % (FLUSH) 0.9 %
5-40 SYRINGE (ML) INJECTION EVERY 12 HOURS SCHEDULED
Status: DISCONTINUED | OUTPATIENT
Start: 2021-07-19 | End: 2021-07-21 | Stop reason: HOSPADM

## 2021-07-19 RX ORDER — SODIUM CHLORIDE 9 MG/ML
INJECTION, SOLUTION INTRAVENOUS CONTINUOUS
Status: DISCONTINUED | OUTPATIENT
Start: 2021-07-19 | End: 2021-07-21 | Stop reason: HOSPADM

## 2021-07-19 RX ORDER — PROMETHAZINE HYDROCHLORIDE 25 MG/ML
6.25 INJECTION, SOLUTION INTRAMUSCULAR; INTRAVENOUS
Status: DISCONTINUED | OUTPATIENT
Start: 2021-07-19 | End: 2021-07-19 | Stop reason: HOSPADM

## 2021-07-19 RX ORDER — FENTANYL CITRATE 50 UG/ML
INJECTION, SOLUTION INTRAMUSCULAR; INTRAVENOUS PRN
Status: DISCONTINUED | OUTPATIENT
Start: 2021-07-19 | End: 2021-07-19 | Stop reason: SDUPTHER

## 2021-07-19 RX ORDER — BUPIVACAINE HYDROCHLORIDE 5 MG/ML
INJECTION, SOLUTION EPIDURAL; INTRACAUDAL PRN
Status: DISCONTINUED | OUTPATIENT
Start: 2021-07-19 | End: 2021-07-19 | Stop reason: HOSPADM

## 2021-07-19 RX ORDER — DEXAMETHASONE SODIUM PHOSPHATE 4 MG/ML
INJECTION, SOLUTION INTRA-ARTICULAR; INTRALESIONAL; INTRAMUSCULAR; INTRAVENOUS; SOFT TISSUE PRN
Status: DISCONTINUED | OUTPATIENT
Start: 2021-07-19 | End: 2021-07-19 | Stop reason: SDUPTHER

## 2021-07-19 RX ORDER — SODIUM CHLORIDE 9 MG/ML
25 INJECTION, SOLUTION INTRAVENOUS PRN
Status: DISCONTINUED | OUTPATIENT
Start: 2021-07-19 | End: 2021-07-19 | Stop reason: HOSPADM

## 2021-07-19 RX ADMIN — SODIUM CHLORIDE: 9 INJECTION, SOLUTION INTRAVENOUS at 12:44

## 2021-07-19 RX ADMIN — PROPOFOL 140 MG: 10 INJECTION, EMULSION INTRAVENOUS at 12:34

## 2021-07-19 RX ADMIN — Medication 100 MCG: at 15:09

## 2021-07-19 RX ADMIN — HYDROMORPHONE HYDROCHLORIDE 0.5 MG: 1 INJECTION, SOLUTION INTRAMUSCULAR; INTRAVENOUS; SUBCUTANEOUS at 17:20

## 2021-07-19 RX ADMIN — Medication 100 MCG: at 14:59

## 2021-07-19 RX ADMIN — EPHEDRINE SULFATE 10 MG: 50 INJECTION INTRAVENOUS at 15:41

## 2021-07-19 RX ADMIN — HYDROMORPHONE HYDROCHLORIDE 0.5 MG: 1 INJECTION, SOLUTION INTRAMUSCULAR; INTRAVENOUS; SUBCUTANEOUS at 16:27

## 2021-07-19 RX ADMIN — APREPITANT 40 MG: 40 CAPSULE ORAL at 10:07

## 2021-07-19 RX ADMIN — Medication 100 MCG: at 15:14

## 2021-07-19 RX ADMIN — MIDAZOLAM 2 MG: 1 INJECTION INTRAMUSCULAR; INTRAVENOUS at 10:14

## 2021-07-19 RX ADMIN — SODIUM CHLORIDE 1000 ML: 9 INJECTION, SOLUTION INTRAVENOUS at 16:32

## 2021-07-19 RX ADMIN — ROCURONIUM BROMIDE 30 MG: 10 SOLUTION INTRAVENOUS at 14:10

## 2021-07-19 RX ADMIN — ROCURONIUM BROMIDE 20 MG: 10 SOLUTION INTRAVENOUS at 14:59

## 2021-07-19 RX ADMIN — SODIUM CHLORIDE, SODIUM LACTATE, POTASSIUM CHLORIDE, AND CALCIUM CHLORIDE: .6; .31; .03; .02 INJECTION, SOLUTION INTRAVENOUS at 10:10

## 2021-07-19 RX ADMIN — Medication 100 MCG: at 15:05

## 2021-07-19 RX ADMIN — FENTANYL CITRATE 100 MCG: 50 INJECTION INTRAMUSCULAR; INTRAVENOUS at 12:34

## 2021-07-19 RX ADMIN — MIDAZOLAM HYDROCHLORIDE 2 MG: 2 INJECTION, SOLUTION INTRAMUSCULAR; INTRAVENOUS at 12:26

## 2021-07-19 RX ADMIN — FENTANYL CITRATE 50 MCG: 50 INJECTION INTRAMUSCULAR; INTRAVENOUS at 14:40

## 2021-07-19 RX ADMIN — SERTRALINE HYDROCHLORIDE 50 MG: 50 TABLET ORAL at 20:55

## 2021-07-19 RX ADMIN — ROCURONIUM BROMIDE 50 MG: 10 SOLUTION INTRAVENOUS at 12:34

## 2021-07-19 RX ADMIN — Medication 200 MCG: at 15:22

## 2021-07-19 RX ADMIN — DOCUSATE SODIUM AND SENNOSIDES 2 TABLET: 8.6; 5 TABLET, FILM COATED ORAL at 20:55

## 2021-07-19 RX ADMIN — SODIUM CHLORIDE: 9 INJECTION, SOLUTION INTRAVENOUS at 19:37

## 2021-07-19 RX ADMIN — Medication 100 MCG: at 15:53

## 2021-07-19 RX ADMIN — ONDANSETRON 4 MG: 2 INJECTION INTRAMUSCULAR; INTRAVENOUS at 12:51

## 2021-07-19 RX ADMIN — PROPOFOL 30 MG: 10 INJECTION, EMULSION INTRAVENOUS at 15:49

## 2021-07-19 RX ADMIN — ALPRAZOLAM 0.5 MG: 0.25 TABLET ORAL at 20:55

## 2021-07-19 RX ADMIN — Medication 200 MCG: at 15:28

## 2021-07-19 RX ADMIN — Medication 100 MCG: at 15:17

## 2021-07-19 RX ADMIN — DEXAMETHASONE SODIUM PHOSPHATE 10 MG: 4 INJECTION, SOLUTION INTRAMUSCULAR; INTRAVENOUS at 12:51

## 2021-07-19 RX ADMIN — Medication 100 MCG: at 14:25

## 2021-07-19 RX ADMIN — MAGNESIUM GLUCONATE 500 MG ORAL TABLET 400 MG: 500 TABLET ORAL at 20:55

## 2021-07-19 RX ADMIN — HYDRALAZINE HYDROCHLORIDE 2.5 MG: 20 INJECTION INTRAMUSCULAR; INTRAVENOUS at 14:48

## 2021-07-19 RX ADMIN — MIDAZOLAM 2 MG: 1 INJECTION INTRAMUSCULAR; INTRAVENOUS at 11:28

## 2021-07-19 RX ADMIN — EPHEDRINE SULFATE 20 MG: 50 INJECTION INTRAVENOUS at 15:34

## 2021-07-19 RX ADMIN — CEFAZOLIN 1000 MG: 1 INJECTION, POWDER, FOR SOLUTION INTRAMUSCULAR; INTRAVENOUS at 20:54

## 2021-07-19 RX ADMIN — LIDOCAINE HYDROCHLORIDE 60 MG: 20 INJECTION, SOLUTION INFILTRATION; PERINEURAL at 12:34

## 2021-07-19 RX ADMIN — CEFAZOLIN 2000 MG: 10 INJECTION, POWDER, FOR SOLUTION INTRAVENOUS at 12:41

## 2021-07-19 RX ADMIN — HYDRALAZINE HYDROCHLORIDE 5 MG: 20 INJECTION INTRAMUSCULAR; INTRAVENOUS at 14:22

## 2021-07-19 RX ADMIN — Medication 100 MCG: at 14:31

## 2021-07-19 RX ADMIN — SUGAMMADEX 200 MG: 100 INJECTION, SOLUTION INTRAVENOUS at 15:54

## 2021-07-19 RX ADMIN — FENTANYL CITRATE 100 MCG: 50 INJECTION INTRAMUSCULAR; INTRAVENOUS at 14:10

## 2021-07-19 RX ADMIN — ACETAMINOPHEN 650 MG: 325 TABLET, FILM COATED ORAL at 20:58

## 2021-07-19 ASSESSMENT — PULMONARY FUNCTION TESTS
PIF_VALUE: 13
PIF_VALUE: 13
PIF_VALUE: 7
PIF_VALUE: 27
PIF_VALUE: 26
PIF_VALUE: 16
PIF_VALUE: 15
PIF_VALUE: 25
PIF_VALUE: 14
PIF_VALUE: 23
PIF_VALUE: 25
PIF_VALUE: 24
PIF_VALUE: 24
PIF_VALUE: 14
PIF_VALUE: 23
PIF_VALUE: 25
PIF_VALUE: 21
PIF_VALUE: 0
PIF_VALUE: 23
PIF_VALUE: 21
PIF_VALUE: 23
PIF_VALUE: 24
PIF_VALUE: 25
PIF_VALUE: 12
PIF_VALUE: 2
PIF_VALUE: 24
PIF_VALUE: 25
PIF_VALUE: 26
PIF_VALUE: 24
PIF_VALUE: 24
PIF_VALUE: 14
PIF_VALUE: 14
PIF_VALUE: 23
PIF_VALUE: 23
PIF_VALUE: 27
PIF_VALUE: 14
PIF_VALUE: 13
PIF_VALUE: 24
PIF_VALUE: 23
PIF_VALUE: 24
PIF_VALUE: 14
PIF_VALUE: 25
PIF_VALUE: 25
PIF_VALUE: 14
PIF_VALUE: 24
PIF_VALUE: 25
PIF_VALUE: 15
PIF_VALUE: 24
PIF_VALUE: 15
PIF_VALUE: 13
PIF_VALUE: 15
PIF_VALUE: 24
PIF_VALUE: 14
PIF_VALUE: 22
PIF_VALUE: 23
PIF_VALUE: 25
PIF_VALUE: 26
PIF_VALUE: 25
PIF_VALUE: 25
PIF_VALUE: 14
PIF_VALUE: 26
PIF_VALUE: 22
PIF_VALUE: 14
PIF_VALUE: 24
PIF_VALUE: 24
PIF_VALUE: 13
PIF_VALUE: 24
PIF_VALUE: 16
PIF_VALUE: 26
PIF_VALUE: 15
PIF_VALUE: 23
PIF_VALUE: 4
PIF_VALUE: 1
PIF_VALUE: 25
PIF_VALUE: 23
PIF_VALUE: 23
PIF_VALUE: 24
PIF_VALUE: 23
PIF_VALUE: 25
PIF_VALUE: 25
PIF_VALUE: 15
PIF_VALUE: 14
PIF_VALUE: 25
PIF_VALUE: 14
PIF_VALUE: 24
PIF_VALUE: 13
PIF_VALUE: 24
PIF_VALUE: 15
PIF_VALUE: 12
PIF_VALUE: 24
PIF_VALUE: 24
PIF_VALUE: 25
PIF_VALUE: 19
PIF_VALUE: 23
PIF_VALUE: 15
PIF_VALUE: 23
PIF_VALUE: 24
PIF_VALUE: 23
PIF_VALUE: 24
PIF_VALUE: 24
PIF_VALUE: 13
PIF_VALUE: 25
PIF_VALUE: 25
PIF_VALUE: 24
PIF_VALUE: 1
PIF_VALUE: 12
PIF_VALUE: 18
PIF_VALUE: 23
PIF_VALUE: 14
PIF_VALUE: 24
PIF_VALUE: 27
PIF_VALUE: 23
PIF_VALUE: 25
PIF_VALUE: 22
PIF_VALUE: 17
PIF_VALUE: 24
PIF_VALUE: 13
PIF_VALUE: 24
PIF_VALUE: 14
PIF_VALUE: 14
PIF_VALUE: 19
PIF_VALUE: 25
PIF_VALUE: 24
PIF_VALUE: 24
PIF_VALUE: 4
PIF_VALUE: 23
PIF_VALUE: 26
PIF_VALUE: 14
PIF_VALUE: 25
PIF_VALUE: 15
PIF_VALUE: 25
PIF_VALUE: 25
PIF_VALUE: 21
PIF_VALUE: 13
PIF_VALUE: 15
PIF_VALUE: 24
PIF_VALUE: 23
PIF_VALUE: 29
PIF_VALUE: 15
PIF_VALUE: 13
PIF_VALUE: 12
PIF_VALUE: 13
PIF_VALUE: 24
PIF_VALUE: 25
PIF_VALUE: 13
PIF_VALUE: 24
PIF_VALUE: 25
PIF_VALUE: 24
PIF_VALUE: 27
PIF_VALUE: 13
PIF_VALUE: 24
PIF_VALUE: 24
PIF_VALUE: 13
PIF_VALUE: 24
PIF_VALUE: 13
PIF_VALUE: 23
PIF_VALUE: 23
PIF_VALUE: 24
PIF_VALUE: 24
PIF_VALUE: 13
PIF_VALUE: 1
PIF_VALUE: 24
PIF_VALUE: 25
PIF_VALUE: 24
PIF_VALUE: 14
PIF_VALUE: 12
PIF_VALUE: 13
PIF_VALUE: 10
PIF_VALUE: 14
PIF_VALUE: 22
PIF_VALUE: 13
PIF_VALUE: 14
PIF_VALUE: 17
PIF_VALUE: 12
PIF_VALUE: 24
PIF_VALUE: 23
PIF_VALUE: 13
PIF_VALUE: 25
PIF_VALUE: 13
PIF_VALUE: 1
PIF_VALUE: 24
PIF_VALUE: 23
PIF_VALUE: 23
PIF_VALUE: 24
PIF_VALUE: 24
PIF_VALUE: 0
PIF_VALUE: 24
PIF_VALUE: 13
PIF_VALUE: 23
PIF_VALUE: 24
PIF_VALUE: 1
PIF_VALUE: 23
PIF_VALUE: 24
PIF_VALUE: 15
PIF_VALUE: 13
PIF_VALUE: 23
PIF_VALUE: 14
PIF_VALUE: 24
PIF_VALUE: 14
PIF_VALUE: 13
PIF_VALUE: 24
PIF_VALUE: 15
PIF_VALUE: 24
PIF_VALUE: 23
PIF_VALUE: 24
PIF_VALUE: 16

## 2021-07-19 ASSESSMENT — PAIN DESCRIPTION - ORIENTATION: ORIENTATION: OTHER (COMMENT)

## 2021-07-19 ASSESSMENT — PAIN SCALES - GENERAL
PAINLEVEL_OUTOF10: 5
PAINLEVEL_OUTOF10: 0
PAINLEVEL_OUTOF10: 6
PAINLEVEL_OUTOF10: 5

## 2021-07-19 ASSESSMENT — PAIN DESCRIPTION - FREQUENCY
FREQUENCY: OTHER (COMMENT)
FREQUENCY: CONTINUOUS

## 2021-07-19 ASSESSMENT — PAIN DESCRIPTION - PAIN TYPE
TYPE: SURGICAL PAIN
TYPE: OTHER (COMMENT)

## 2021-07-19 ASSESSMENT — PAIN DESCRIPTION - PROGRESSION: CLINICAL_PROGRESSION: OTHER (COMMENT)

## 2021-07-19 ASSESSMENT — PAIN DESCRIPTION - LOCATION
LOCATION: OTHER (COMMENT)
LOCATION: ABDOMEN

## 2021-07-19 ASSESSMENT — PAIN DESCRIPTION - ONSET: ONSET: OTHER (COMMENT)

## 2021-07-19 ASSESSMENT — PAIN DESCRIPTION - DESCRIPTORS: DESCRIPTORS: OTHER (COMMENT)

## 2021-07-19 NOTE — PROGRESS NOTES
4 Eyes Skin Assessment     The patient is being assess for  Admission    I agree that 2 RN's have performed a thorough Head to Toe Skin Assessment on the patient. ALL assessment sites listed below have been assessed. Areas assessed by both nurses: lloyd rn peri rn  [x]   Head, Face, and Ears   [x]   Shoulders, Back, and Chest  [x]   Arms, Elbows, and Hands   [x]   Coccyx, Sacrum, and Ischum  [x]   Legs, Feet, and Heels    4 laps and midline surgical glued        Does the Patient have Skin Breakdown?   No         Hernán Prevention initiated:  No   Wound Care Orders initiated:  No      [facility] nurse consulted for Pressure Injury (Stage 3,4, Unstageable, DTI, NWPT, and Complex wounds):  No      Nurse 1 eSignature: Electronically signed by Adán Phelps RN on 7/19/21 at 6:24 PM EDT    **SHARE this note so that the co-signing nurse is able to place an eSignature**    Nurse 2 eSignature: Electronically signed by Dileep Bruce RN on 7/19/21 at 6:45 PM EDT

## 2021-07-19 NOTE — FLOWSHEET NOTE
07/19/21 0830   Vital Signs   Temp 98.1 °F (36.7 °C)   Temp Source Temporal   Pulse 100   Heart Rate Source Monitor   Resp 16   BP (!) 172/106   BP Location Right upper arm   Level of Consciousness Alert (0)   MEWS Score 1   Patient Currently in Pain Denies   Pain Assessment   Pain Assessment 0-10   Pain Level 0   Patient's Stated Pain Goal 4   Oxygen Therapy   SpO2 98 %   O2 Device None (Room air)   Height and Weight   Height 5' (1.524 m)   Weight 99 lb 7 oz (45.1 kg)   Weight Method Actual;Standing scale   BSA (Calculated - sq m) 1.38 sq meters   BMI (Calculated) 19.5   Arrived to [facility] consent verified at this time, NPO since 2200. Belongings in locker 2.

## 2021-07-19 NOTE — PROGRESS NOTES
Gave bedside report to Todd. Patient pain level tolerable at time of transfer at 4/10.  Patient able to tolerate ice and is a/o x4

## 2021-07-19 NOTE — ANESTHESIA POSTPROCEDURE EVALUATION
Department of Anesthesiology  Postprocedure Note    Patient: Kacey Crum  MRN: [id number]  YOB: 1955  Date of evaluation: 7/19/2021  Time:  7:07 PM     Procedure Summary     Date: 07/19/21 Room / Location: [address] [place] [address]    Anesthesia Start: 1226 Anesthesia Stop: 5571    Procedure: ROBOTIC RADICAL LEFT NEPHRECTOMY (Left Abdomen) Diagnosis:       Neoplasm of uncertain behavior of left kidney      (MASS RENAL LEFT)    Surgeons: Porfirio Alexis MD Responsible Provider: Chantelle Valencia MD    Anesthesia Type: general ASA Status: 3          Anesthesia Type: general    Keisha Phase I: Keisha Score: 10    Keisha Phase II:      Last vitals: Reviewed and per EMR flowsheets. Anesthesia Post Evaluation    Patient location during evaluation: PACU  Patient participation: complete - patient participated  Level of consciousness: awake and alert  Airway patency: patent  Nausea & Vomiting: no nausea and no vomiting  Complications: no  Cardiovascular status: hemodynamically stable  Respiratory status: acceptable  Hydration status: euvolemic  Comments: Pt with lower blood pressure. Treated with fluid bolus. VSS on transfer to phase 2 recovery. No anesthetic complications.

## 2021-07-19 NOTE — OP NOTE
Operative Note      Patient: Italia Rodriguez  YOB: 1955  MRN: [id number]    Date of Procedure: 7/19/2021      PREOPERATIVE DIAGNOSES: Left renal neoplasm uncertain behavior     POSTOPERATIVE DIAGNOSIS:  same     OPERATION PERFORMED:  Robotic-assisted laparoscopic LEFT radical nephrectomy, adrenal sparing -- complicated due to large size and due to extensive time mobilizing artery/vein/ kidney. SURGEON:  Loretta Buckner MD     SURGICAL FIRST ASSISTANT:  Teresa Calvo    ANESTHESIA:  General with ET tube. INDICATIONS:  see h/p office notes. Consent signed. 8-9cm central medial mass. FINDINGS:  1 art, 1 vein  (vein stapled); Large mass with many collateral vessels. Midline extraction of specimen. OPERATIVE PROCEDURE:  The patient was brought to the operating theater. Anesthesia induced. Antibiotics were administered. A Cloud catheter was deployed. The pt was then positioned in the lateral decubitus. All bony prominences were protected. The pt was secured to the table. Prepped and draped in usual sterile fashion. Local anesthestic was injected at each port site incision. Veress access was achieved at the lateral border of the rectus at about 11th rib. We made an 8-mm incision and inserted the Veress needle. Drop test was negative. Opening insufflation pressures were low. We then deployed the 8-mm trocar with obturator. The lens with camera was inserted and there were no inadvertent bowel injuries or injuries in the abdomen or bleeding seen. We then deployed remainder of the robotic trocars (3 total) in line with the camera port and they were deployed under direct vision. We then deployed an assistant port near the umbilicus which is 12 mm. We docked the robot. The mass was bulging into view pushing colon very medial.  We sharply mobilized the colon medially and this took some time due to adherence to the mass.   We identified the ureter inferiorly and it was stuck to the large mass.   We found psoas and push ureter and kidney later and then we identified the renal vein and renal artery. We isolated the renal artery and clipped it with two Weck clips on the stay side and one on the go. We then used  45 vascular load stapler on the renal vein. We had some oozing near the edge of staple line and then deployed 2 more weck clips on the edge. Once we had adequate control of the hilum, we return to mobilize the large mass. Due to parasitic vessels/collaterals this took a long time to mobilize medial and upper pole and care was taken to avoid the adrenal gland. Once we had freed kidney posteriorly and finally laterally and inferiorly. We then dissected the ureter down to pelvic brim and we transected the ureter with Weck clip on both ends, stay and go sides. We put the kidney in an endo-catch bag. We copiously irrigated the renal fossa. We inspected for bleeding and there was no obvious bleeding. We then undocked the robot. We extracted the specimen through a vertical midline incision by extending the 12mm assistant port skin incision. We carefully dissected down to the fascia, made the laparotomy wide enough to remove the kidney in the bag. We then closed the fascia with a running #1 PDS suture from both corners of the incision tied in the middle. We then re-insufflated the abdomen to assess our hemostasis in the renal fossa and abdomen. There was no active bleeding. We also checked the anterior abdominal wall to confirm adequate closure and that there were no entrapped loops of bowel or omentum. Small umbilical hernia was there but present prior to the case. We desufflated the abdomen. We then removed the remainder of the trocars under direct vision. We irrigated all incisions and closed them with running subcuticular suture of 4-0 monocryl. The patient was emerged the patient from anesthesia and returned to supine and brought to the PACU in stable condition. COMPLICATIONS:  None apparent. IV FLUIDS:  2000cc     ESTIMATED BLOOD LOSS:  100 mL. URINE OUTPUT:  200 mL. DRAINS:  Cloud catheter. DISPOSITION:  Regular nursing floor for routing postop care.        Bret Mcclain MD  Office: [phone]    Electronically signed by Bret Mcclain MD on 7/19/2021 at 3:45 PM

## 2021-07-19 NOTE — ANESTHESIA PRE PROCEDURE
Department of Anesthesiology  Preprocedure Note       Name:  Annel Blake   Age:  74 y.o.  :  1955                                          MRN:  [id number]         Date:  2021      Surgeon: Barb Palacios):  Milana Villeda MD    Procedure: Procedure(s):  ROBOTIC POSSIBLE OPEN RADICAL LEFT NEPHRECTOMY    Medications prior to admission:   Prior to Admission medications    Medication Sig Start Date End Date Taking? Authorizing Provider   Magnesium 400 MG TABS Take 400 mg by mouth daily   Yes Historical Provider, MD   VITAMIN A PO Take 25,000 Units by mouth daily   Yes Historical Provider, MD   zinc 50 MG TABS tablet Take 50 mg by mouth daily   Yes Historical Provider, MD   ALPRAZolam (XANAX) 0.5 MG tablet Take 1 tablet by mouth 2 times daily as needed for Sleep or Anxiety for up to 30 days. 21 Yes LEELEE Coello CNP   sertraline (ZOLOFT) 50 MG tablet Take 1 tablet by mouth daily Take 1 tab daily for anxiety 21  Yes LEELEE Coello CNP   metoprolol succinate (TOPROL XL) 25 MG extended release tablet TAKE WITH ONE 50 MG TABLET DAILY FOR TOTAL DAILY DOSE OF 75 MG 21  Yes LEELEE Coello CNP   Cholecalciferol (VITAMIN D3) 125 MCG (5000 UT) TABS Take by mouth   Yes Historical Provider, MD   amLODIPine (NORVASC) 5 MG tablet TAKE ONE TABLET BY MOUTH TWICE A DAY 21  Yes Eusebio Mujica MD   metoprolol succinate (TOPROL XL) 50 MG extended release tablet Take 1 tablet by mouth daily 21  Yes Teodoro Bar DO   potassium chloride (KLOR-CON) 10 MEQ extended release tablet  5/3/21  Yes Historical Provider, MD   Ascorbic Acid (VITAMIN C CR) 1000 MG TBCR Take  by mouth 2 times daily.      Yes Historical Provider, MD       Current medications:    Current Facility-Administered Medications   Medication Dose Route Frequency Provider Last Rate Last Admin    ceFAZolin (ANCEF) 2000 mg in dextrose 5 % 100 mL IVPB  2,000 mg Intravenous On Call to [address] Drew Aquino MD        lactated ringers infusion   Intravenous Continuous Gera Mari MD        sodium chloride flush 0.9 % injection 10 mL  10 mL Intravenous 2 times per day Gera Mari MD        sodium chloride flush 0.9 % injection 10 mL  10 mL Intravenous PRN Gera Mari MD        0.9 % sodium chloride infusion  25 mL Intravenous PRN Gera Mari MD        lidocaine PF 1 % injection 1 mL  1 mL Intradermal Once PRN Gera Mari MD           Allergies:     Allergies   Allergen Reactions    Antihistamine [Diphenhydramine Hcl] Other (See Comments)     All make feel weird and sick    Codeine Other (See Comments)    Epinephrine     Other      Pain meds  Very sensative some rash  Others make feel weird not sure of any names       Problem List:    Patient Active Problem List   Diagnosis Code    Essential hypertension I10    Prediabetes R73.03    Anxiety F41.9    Microscopic hematuria R31.29    Mood disorder ([facility] [address] F39    Panic disorder F41.0       Past Medical History:        Diagnosis Date    Anxiety     Complication of anesthesia     states when had one of her spinals for csect her whole body went numb    Hypertension     Kidney mass        Past Surgical History:        Procedure Laterality Date     SECTION      x3    DENTAL SURGERY      DILATION AND CURETTAGE OF UTERUS  11    hysteroscopy,polypectomy    TONSILLECTOMY         Social History:    Social History     Tobacco Use    Smoking status: Former Smoker     Packs/day: 0.50     Years: 10.00     Pack years: 5.00     Types: Cigarettes     Quit date: 2008     Years since quittin.5    Smokeless tobacco: Never Used    Tobacco comment: quit [id number]   Substance Use Topics    Alcohol use: Not Currently     Comment: occ                                Counseling given: Not Answered  Comment: quit [id number]      Vital Signs (Current):   Vitals:    21 1353 21 0830 21 0845   BP: Tarik Decker ) 172/106 (!) 175/99   Pulse:  100    Resp:  16    Temp:  98.1 °F (36.7 °C)    TempSrc:  Temporal    SpO2:  98%    Weight: 100 lb (45.4 kg) 99 lb 7 oz (45.1 kg)    Height: 5' (1.524 m) 5' (1.524 m)                                               BP Readings from Last 3 Encounters:   07/19/21 (!) 175/99   07/15/21 (!) 138/90   07/02/21 (!) 168/94       NPO Status:                                                                                 BMI:   Wt Readings from Last 3 Encounters:   07/19/21 99 lb 7 oz (45.1 kg)   07/15/21 101 lb (45.8 kg)   07/02/21 100 lb (45.4 kg)     Body mass index is 19.42 kg/m². CBC:   Lab Results   Component Value Date    WBC 9.9 07/14/2021    RBC 4.95 07/14/2021    HGB 14.5 07/14/2021    HCT 41.8 07/14/2021    MCV 84.4 07/14/2021    RDW 15.5 07/14/2021     07/14/2021       CMP:   Lab Results   Component Value Date     07/14/2021    K 4.3 07/14/2021    CL 93 07/14/2021    CO2 26 07/14/2021    BUN 25 07/14/2021    CREATININE 1.0 07/14/2021    GFRAA >60 07/14/2021    AGRATIO 1.5 04/12/2021    LABGLOM 55 07/14/2021    GLUCOSE 153 07/14/2021    PROT 8.1 04/12/2021    CALCIUM 10.4 07/14/2021    BILITOT 0.6 04/12/2021    ALKPHOS 116 04/12/2021    AST 21 04/12/2021    ALT 37 04/12/2021       POC Tests: No results for input(s): POCGLU, POCNA, POCK, POCCL, POCBUN, POCHEMO, POCHCT in the last 72 hours.     Coags: No results found for: PROTIME, INR, APTT    HCG (If Applicable): No results found for: PREGTESTUR, PREGSERUM, HCG, HCGQUANT     ABGs: No results found for: PHART, PO2ART, HAK4EMT, MHJ1CXM, BEART, S4TABNOJ     Type & Screen (If Applicable):  No results found for: LABABO, LABRH    Drug/Infectious Status (If Applicable):  No results found for: HIV, HEPCAB    COVID-19 Screening (If Applicable):   Lab Results   Component Value Date    COVID19 Not Detected 07/14/2021           Anesthesia Evaluation  Patient summary reviewed no history of anesthetic complications:   Airway: Mallampati: II  TM distance: >3 FB   Neck ROM: full  Mouth opening: > = 3 FB Dental: normal exam         Pulmonary:Negative Pulmonary ROS and normal exam  breath sounds clear to auscultation                             Cardiovascular:Negative CV ROS  Exercise tolerance: good (>4 METS),   (+) hypertension:,         Rhythm: regular  Rate: normal                    Neuro/Psych:   Negative Neuro/Psych ROS              GI/Hepatic/Renal: Neg GI/Hepatic/Renal ROS            Endo/Other: Negative Endo/Other ROS                    Abdominal:             Vascular: negative vascular ROS. 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   BMI:  Body mass index is 19.42 kg/m².      Vitals:    21 1353 21 0830 21 0845   BP:  (!) 172/106 (!) 175/99   Pulse:  100    Resp:  16    Temp:  98.1 °F (36.7 °C)    TempSrc:  Temporal    SpO2:  98%    Weight: 100 lb (45.4 kg) 99 lb 7 oz (45.1 kg)    Height: 5' (1.524 m) 5' (1.524 m)        Allergies   Allergen Reactions    Antihistamine [Diphenhydramine Hcl] Other (See Comments)     All make feel weird and sick    Codeine Other (See Comments)    Epinephrine     Other      Pain meds  Very sensative some rash  Others make feel weird not sure of any names       Social History     Tobacco Use    Smoking status: Former Smoker     Packs/day: 0.50     Years: 10.00     Pack years: 5.00     Types: Cigarettes     Quit date: 2008     Years since quittin.5    Smokeless tobacco: Never Used    Tobacco comment: quit [id number]   Substance Use Topics    Alcohol use: Not Currently     Comment: occ       LABS:    CBC  Lab Results   Component Value Date/Time    WBC 9.9 2021 11:44 AM    HGB 14.5 2021 11:44 AM    HCT 41.8 2021 11:44 AM     (H) 2021 11:44 AM     RENAL  Lab Results   Component Value Date/Time     2021 11:44 AM    K 4.3 2021 11:44 AM    CL 93 (L) 07/14/2021 11:44 AM    CO2 26 07/14/2021 11:44 AM    BUN 25 (H) 07/14/2021 11:44 AM    CREATININE 1.0 07/14/2021 11:44 AM    GLUCOSE 153 (H) 07/14/2021 11:44 AM     COAGS  No results found for: PROTIME, INR, APTT          Anesthesia Plan      general     ASA 3     (I discussed with the patient the risks and benefits of PIV, anesthesia, IV Narcotics, PACU. All questions were answered the patient agrees with the plan and wishes to proceed)  Induction: intravenous.                           Bao Fitzgerald MD   7/19/2021

## 2021-07-20 LAB
ANION GAP SERPL CALCULATED.3IONS-SCNC: 11 MMOL/L (ref 3–16)
BUN BLDV-MCNC: 22 MG/DL (ref 7–20)
CALCIUM SERPL-MCNC: 8.5 MG/DL (ref 8.3–10.6)
CHLORIDE BLD-SCNC: 101 MMOL/L (ref 99–110)
CO2: 24 MMOL/L (ref 21–32)
CREAT SERPL-MCNC: 1 MG/DL (ref 0.6–1.2)
GFR AFRICAN AMERICAN: >60
GFR NON-AFRICAN AMERICAN: 55
GLUCOSE BLD-MCNC: 122 MG/DL (ref 70–99)
HCT VFR BLD CALC: 28.6 % (ref 36–48)
HCT VFR BLD CALC: 30.1 % (ref 36–48)
HEMOGLOBIN: 10.2 G/DL (ref 12–16)
HEMOGLOBIN: 9.7 G/DL (ref 12–16)
MCH RBC QN AUTO: 28.3 PG (ref 26–34)
MCH RBC QN AUTO: 28.8 PG (ref 26–34)
MCHC RBC AUTO-ENTMCNC: 33.9 G/DL (ref 31–36)
MCHC RBC AUTO-ENTMCNC: 33.9 G/DL (ref 31–36)
MCV RBC AUTO: 83.5 FL (ref 80–100)
MCV RBC AUTO: 85 FL (ref 80–100)
PDW BLD-RTO: 15.6 % (ref 12.4–15.4)
PDW BLD-RTO: 16 % (ref 12.4–15.4)
PLATELET # BLD: 364 K/UL (ref 135–450)
PLATELET # BLD: 387 K/UL (ref 135–450)
PMV BLD AUTO: 6.7 FL (ref 5–10.5)
PMV BLD AUTO: 6.9 FL (ref 5–10.5)
POTASSIUM REFLEX MAGNESIUM: 3.7 MMOL/L (ref 3.5–5.1)
RBC # BLD: 3.42 M/UL (ref 4–5.2)
RBC # BLD: 3.54 M/UL (ref 4–5.2)
SODIUM BLD-SCNC: 136 MMOL/L (ref 136–145)
WBC # BLD: 11.4 K/UL (ref 4–11)
WBC # BLD: 9.9 K/UL (ref 4–11)

## 2021-07-20 PROCEDURE — 36415 COLL VENOUS BLD VENIPUNCTURE: CPT

## 2021-07-20 PROCEDURE — 2580000003 HC RX 258: Performed by: UROLOGY

## 2021-07-20 PROCEDURE — 6360000002 HC RX W HCPCS: Performed by: UROLOGY

## 2021-07-20 PROCEDURE — 80048 BASIC METABOLIC PNL TOTAL CA: CPT

## 2021-07-20 PROCEDURE — 1200000000 HC SEMI PRIVATE

## 2021-07-20 PROCEDURE — 85027 COMPLETE CBC AUTOMATED: CPT

## 2021-07-20 PROCEDURE — 6370000000 HC RX 637 (ALT 250 FOR IP): Performed by: UROLOGY

## 2021-07-20 RX ORDER — KETOROLAC TROMETHAMINE 30 MG/ML
15 INJECTION, SOLUTION INTRAMUSCULAR; INTRAVENOUS EVERY 6 HOURS
Status: COMPLETED | OUTPATIENT
Start: 2021-07-20 | End: 2021-07-21

## 2021-07-20 RX ORDER — 0.9 % SODIUM CHLORIDE 0.9 %
500 INTRAVENOUS SOLUTION INTRAVENOUS ONCE
Status: COMPLETED | OUTPATIENT
Start: 2021-07-20 | End: 2021-07-20

## 2021-07-20 RX ORDER — TRAMADOL HYDROCHLORIDE 50 MG/1
50 TABLET ORAL EVERY 4 HOURS PRN
Status: DISCONTINUED | OUTPATIENT
Start: 2021-07-20 | End: 2021-07-21 | Stop reason: HOSPADM

## 2021-07-20 RX ORDER — LORAZEPAM 2 MG/ML
0.5 INJECTION INTRAMUSCULAR
Status: DISCONTINUED | OUTPATIENT
Start: 2021-07-20 | End: 2021-07-21 | Stop reason: HOSPADM

## 2021-07-20 RX ORDER — MORPHINE SULFATE 2 MG/ML
2 INJECTION, SOLUTION INTRAMUSCULAR; INTRAVENOUS
Status: DISCONTINUED | OUTPATIENT
Start: 2021-07-20 | End: 2021-07-21 | Stop reason: HOSPADM

## 2021-07-20 RX ADMIN — OXYCODONE HYDROCHLORIDE 5 MG: 5 TABLET ORAL at 08:47

## 2021-07-20 RX ADMIN — KETOROLAC TROMETHAMINE 15 MG: 30 INJECTION, SOLUTION INTRAMUSCULAR at 17:49

## 2021-07-20 RX ADMIN — SODIUM CHLORIDE 500 ML: 9 INJECTION, SOLUTION INTRAVENOUS at 17:54

## 2021-07-20 RX ADMIN — DOCUSATE SODIUM AND SENNOSIDES 2 TABLET: 8.6; 5 TABLET, FILM COATED ORAL at 08:48

## 2021-07-20 RX ADMIN — SODIUM CHLORIDE: 9 INJECTION, SOLUTION INTRAVENOUS at 16:54

## 2021-07-20 RX ADMIN — ACETAMINOPHEN 650 MG: 325 TABLET, FILM COATED ORAL at 05:34

## 2021-07-20 RX ADMIN — ACETAMINOPHEN 650 MG: 325 TABLET, FILM COATED ORAL at 17:49

## 2021-07-20 RX ADMIN — CEFAZOLIN 1000 MG: 1 INJECTION, POWDER, FOR SOLUTION INTRAMUSCULAR; INTRAVENOUS at 12:01

## 2021-07-20 RX ADMIN — ENOXAPARIN SODIUM 40 MG: 40 INJECTION SUBCUTANEOUS at 08:48

## 2021-07-20 RX ADMIN — SERTRALINE HYDROCHLORIDE 50 MG: 50 TABLET ORAL at 23:00

## 2021-07-20 RX ADMIN — DOCUSATE SODIUM AND SENNOSIDES 2 TABLET: 8.6; 5 TABLET, FILM COATED ORAL at 23:00

## 2021-07-20 RX ADMIN — MAGNESIUM GLUCONATE 500 MG ORAL TABLET 400 MG: 500 TABLET ORAL at 08:47

## 2021-07-20 RX ADMIN — ACETAMINOPHEN 650 MG: 325 TABLET, FILM COATED ORAL at 11:59

## 2021-07-20 RX ADMIN — CEFAZOLIN 1000 MG: 1 INJECTION, POWDER, FOR SOLUTION INTRAMUSCULAR; INTRAVENOUS at 03:15

## 2021-07-20 RX ADMIN — OXYCODONE HYDROCHLORIDE 5 MG: 5 TABLET ORAL at 03:15

## 2021-07-20 ASSESSMENT — PAIN SCALES - GENERAL
PAINLEVEL_OUTOF10: 0
PAINLEVEL_OUTOF10: 7
PAINLEVEL_OUTOF10: 2
PAINLEVEL_OUTOF10: 6
PAINLEVEL_OUTOF10: 0
PAINLEVEL_OUTOF10: 1
PAINLEVEL_OUTOF10: 0
PAINLEVEL_OUTOF10: 6
PAINLEVEL_OUTOF10: 4

## 2021-07-20 ASSESSMENT — PAIN DESCRIPTION - PAIN TYPE
TYPE: SURGICAL PAIN
TYPE: SURGICAL PAIN

## 2021-07-20 ASSESSMENT — PAIN DESCRIPTION - LOCATION: LOCATION: ABDOMEN;SHOULDER

## 2021-07-20 ASSESSMENT — PAIN DESCRIPTION - ORIENTATION: ORIENTATION: RIGHT;LEFT;MID

## 2021-07-20 NOTE — CARE COORDINATION
CASE MANAGEMENT INITIAL ASSESSMENT    Reviewed chart and completed assessment with: Patient    Explained Case Management role/services. Primary contact information: [phone] Decision Maker :   Primary Decision Maker: Salud Alvarez - Spouse - [phone]    Secondary Decision Maker: Tex Tay - Child - [phone]    Secondary Decision Maker: Marc Sism - [phone]        Can this person be reached and be able to respond quickly, such as within a few minutes or hours? Yes    Admit date/status:07/19/2021 Inpatient   Diagnosis: Neoplasm of uncertain behavior of left kidney  Is this a Readmission?:  No      Insurance:[employer]   Precert required for SNF: Yes       3 night stay required: No    Living arrangements, Adls, care needs, prior to admission: Patient reports lives at home with spouse and dogs, 3 steps to enter one level once inside    [address] at home: Denies    Services in the home and/or outpatient, prior to admission:Denies    PT/OT recs:not ordered     Hospital Exemption Notification (HEN):NA    Barriers to discharge: Denies     Plan/comments: Patient plans to return home with support from spouse. Patient reports 3 steps to enter one level once inside. Patient denies prior Antoinette Day or DME. Patient denies needs, will follow for potential changes in clinical status. AZAR Sims    [facility] on chart for MD signature

## 2021-07-20 NOTE — PROGRESS NOTES
Urology Attending Progress Note      Subjective: pt had side effects to pain meds and has shoulder pain. \"I hurt\", tolerating PO    Vitals:  /66   Pulse 102   Temp 98 °F (36.7 °C) (Oral)   Resp 16   Ht 5' (1.524 m)   Wt 99 lb 7 oz (45.1 kg)   LMP 2011   SpO2 94%   BMI 19.42 kg/m²   Temp  Av.7 °F (36.5 °C)  Min: 97.3 °F (36.3 °C)  Max: 98 °F (36.7 °C)    Intake/Output Summary (Last 24 hours) at 2021 1735  Last data filed at 2021 1455  Gross per 24 hour   Intake 1456.24 ml   Output 1575 ml   Net -118.76 ml       Exam: abd soft    Labs:  WBC:    Lab Results   Component Value Date    WBC 9.9 2021     Hemoglobin/Hematocrit:    Lab Results   Component Value Date    HGB 10.2 2021    HCT 30.1 2021     BMP:    Lab Results   Component Value Date     2021    K 3.7 2021     2021    CO2 24 2021    BUN 22 2021    LABALBU 4.9 2021    CREATININE 1.0 2021    CALCIUM 8.5 2021    GFRAA >60 2021    LABGLOM 55 2021     PT/INR:  No results found for: PROTIME, INR  PTT:  No results found for: APTT[APTT      Impression/Plan: POD #1 s/p nephrectomy  1. Pain- give toradol and change PO pain meds to tramadol  2. Recheck CBC and labs in AM  3.  Low BP- bolus and increase IV fluids    Melba Gomes MD

## 2021-07-21 VITALS
HEART RATE: 106 BPM | SYSTOLIC BLOOD PRESSURE: 119 MMHG | OXYGEN SATURATION: 96 % | TEMPERATURE: 98.3 F | BODY MASS INDEX: 19.52 KG/M2 | HEIGHT: 60 IN | RESPIRATION RATE: 16 BRPM | DIASTOLIC BLOOD PRESSURE: 66 MMHG | WEIGHT: 99.44 LBS

## 2021-07-21 LAB
ANION GAP SERPL CALCULATED.3IONS-SCNC: 10 MMOL/L (ref 3–16)
BUN BLDV-MCNC: 18 MG/DL (ref 7–20)
CALCIUM SERPL-MCNC: 8.5 MG/DL (ref 8.3–10.6)
CHLORIDE BLD-SCNC: 107 MMOL/L (ref 99–110)
CO2: 22 MMOL/L (ref 21–32)
CREAT SERPL-MCNC: 1 MG/DL (ref 0.6–1.2)
GFR AFRICAN AMERICAN: >60
GFR NON-AFRICAN AMERICAN: 55
GLUCOSE BLD-MCNC: 99 MG/DL (ref 70–99)
HCT VFR BLD CALC: 26.7 % (ref 36–48)
HEMOGLOBIN: 9.2 G/DL (ref 12–16)
MCH RBC QN AUTO: 28.8 PG (ref 26–34)
MCHC RBC AUTO-ENTMCNC: 34.6 G/DL (ref 31–36)
MCV RBC AUTO: 83.4 FL (ref 80–100)
PDW BLD-RTO: 15.3 % (ref 12.4–15.4)
PLATELET # BLD: 349 K/UL (ref 135–450)
PMV BLD AUTO: 6.7 FL (ref 5–10.5)
POTASSIUM SERPL-SCNC: 3.9 MMOL/L (ref 3.5–5.1)
RBC # BLD: 3.19 M/UL (ref 4–5.2)
SODIUM BLD-SCNC: 139 MMOL/L (ref 136–145)
WBC # BLD: 7.7 K/UL (ref 4–11)

## 2021-07-21 PROCEDURE — 36415 COLL VENOUS BLD VENIPUNCTURE: CPT

## 2021-07-21 PROCEDURE — 6360000002 HC RX W HCPCS: Performed by: UROLOGY

## 2021-07-21 PROCEDURE — 80048 BASIC METABOLIC PNL TOTAL CA: CPT

## 2021-07-21 PROCEDURE — 85027 COMPLETE CBC AUTOMATED: CPT

## 2021-07-21 PROCEDURE — 6370000000 HC RX 637 (ALT 250 FOR IP): Performed by: UROLOGY

## 2021-07-21 RX ADMIN — Medication 50 MG: at 09:55

## 2021-07-21 RX ADMIN — ENOXAPARIN SODIUM 30 MG: 30 INJECTION SUBCUTANEOUS at 09:51

## 2021-07-21 RX ADMIN — ACETAMINOPHEN 650 MG: 325 TABLET, FILM COATED ORAL at 12:19

## 2021-07-21 RX ADMIN — KETOROLAC TROMETHAMINE 15 MG: 30 INJECTION, SOLUTION INTRAMUSCULAR at 00:11

## 2021-07-21 RX ADMIN — ACETAMINOPHEN 650 MG: 325 TABLET, FILM COATED ORAL at 06:18

## 2021-07-21 RX ADMIN — MAGNESIUM GLUCONATE 500 MG ORAL TABLET 400 MG: 500 TABLET ORAL at 09:50

## 2021-07-21 RX ADMIN — ACETAMINOPHEN 650 MG: 325 TABLET, FILM COATED ORAL at 00:11

## 2021-07-21 ASSESSMENT — PAIN SCALES - GENERAL
PAINLEVEL_OUTOF10: 4
PAINLEVEL_OUTOF10: 3
PAINLEVEL_OUTOF10: 3

## 2021-07-21 NOTE — PROGRESS NOTES
Pt assessment completed and documented. VSS on RA. Pt A&O x 4. Pt has 2 laps and midline abd incision CDI NELIA. Pt medicated for pain per MAR. Pt ambulates independently. Bed in lowest position and wheels locked. Non-skid footwear in place. Bed check is on. Call light and bedside table within reach. Pt calls out appropriately and denies any other needs at this time.

## 2021-07-21 NOTE — CARE COORDINATION
CASE MANAGEMENT DISCHARGE SUMMARY    Discharge to: Home     Transportation: Via private car    Confirmed discharge plan with: Patient     RN, name: Dwight Venegas RN     Note: Discharging nurse to complete MARSHA, reconcile AVS, and place final copy with patient's discharge packet. RN to ensure that written prescriptions for  Level II medications are sent with patient to the facility as per protocol.   AZAR Crowder

## 2021-07-21 NOTE — PROGRESS NOTES
Pt discharged via private vehicle in stable condition. All d/c instructions, medication instructions and f/u instructions given. Pt states understanding.

## 2021-07-21 NOTE — DISCHARGE SUMMARY
Discharge Summary     Date:7/21/2021        Patient Name:Catrachita Parsons     Date of Birth:[birth year]     Age:66 y.o. Admit Date:7/19/2021   Admission Condition:good   Discharged Condition:good  Discharge Date: 07/21/21     Discharge Diagnoses   Active Problems:    Neoplasm of uncertain behavior of left kidney  Resolved Problems:    * No resolved hospital problems. [facility] Stay   Narrative of Hospital Course: underwent left nephrectomy - did well - helms out - labs stable - home POD2    Consultants:   None    Time Spent on Discharge:  30 minutes were spent in patient examination, evaluation, counseling as well as medication reconciliation, prescriptions for required medications, discharge plan and follow up. Surgeries/Procedures Performed:  Procedure(s):  ROBOTIC RADICAL LEFT NEPHRECTOMY      Treatments:   surgery:        Significant Diagnostic Studies:   Recent Labs:  CBC:   Lab Results   Component Value Date    WBC 7.7 07/21/2021    RBC 3.19 07/21/2021    HGB 9.2 07/21/2021    HCT 26.7 07/21/2021    MCV 83.4 07/21/2021    MCH 28.8 07/21/2021    MCHC 34.6 07/21/2021    RDW 15.3 07/21/2021     07/21/2021     BMP:    Lab Results   Component Value Date    GLUCOSE 99 07/21/2021     07/21/2021    K 3.9 07/21/2021    K 3.7 07/20/2021     07/21/2021    CO2 22 07/21/2021    ANIONGAP 10 07/21/2021    BUN 18 07/21/2021    CREATININE 1.0 07/21/2021    CALCIUM 8.5 07/21/2021    LABGLOM 55 07/21/2021    GFRAA >60 07/21/2021       Radiology Last 7 Days:  No results found.     Discharge Plan   Disposition: Home    Provider Follow-Up:   Leigh Lang MD  [address] [place] [address]. [address] [facility] [address]  [phone]    In 4 weeks         Hospital/Incidental Findings Requiring Follow-Up:  2 weeks with Dr Ariana Dave    Patient Instructions   Diet: regular diet    Activity: activity as tolerated    Other Instructions:   na    Discharge Medications         Medication List      START taking these medications    oxyCODONE-acetaminophen 5-325 MG per tablet  Commonly known as: Percocet  Take 1 tablet by mouth every 6 hours as needed for Pain for up to 3 days. Intended supply: 3 days. Take lowest dose possible to manage pain     sennosides-docusate sodium 8.6-50 MG tablet  Commonly known as: SENOKOT-S  Take 2 tablets by mouth daily UNTIL REGULAR SOFT BOWEL MOVEMENTS        CONTINUE taking these medications    ALPRAZolam 0.5 MG tablet  Commonly known as: XANAX  Take 1 tablet by mouth 2 times daily as needed for Sleep or Anxiety for up to 30 days. amLODIPine 5 MG tablet  Commonly known as: NORVASC  TAKE ONE TABLET BY MOUTH TWICE A DAY     Ascorbic Acid ER 1000 MG Tbcr     Magnesium 400 MG Tabs     * metoprolol succinate 50 MG extended release tablet  Commonly known as: TOPROL XL  Take 1 tablet by mouth daily     * metoprolol succinate 25 MG extended release tablet  Commonly known as: TOPROL XL  TAKE WITH ONE 50 MG TABLET DAILY FOR TOTAL DAILY DOSE OF 75 MG     potassium chloride 10 MEQ extended release tablet  Commonly known as: KLOR-CON     sertraline 50 MG tablet  Commonly known as: ZOLOFT  Take 1 tablet by mouth daily Take 1 tab daily for anxiety     VITAMIN A PO     Vitamin D3 125 MCG (5000 UT) Tabs     zinc 50 MG Tabs tablet         * This list has 2 medication(s) that are the same as other medications prescribed for you. Read the directions carefully, and ask your doctor or other care provider to review them with you.                Where to Get Your Medications      These medications were sent to [address]  [address], [address] [place] [address]    Phone: [phone]   · sennosides-docusate sodium 8.6-50 MG tablet     You can get these medications from any pharmacy    Bring a paper prescription for each of these medications  · oxyCODONE-acetaminophen 5-325 MG per tablet         Electronically signed by Med Lott Louisa Lewis MD on 7/21/21 at 9:04 AM EDT

## 2021-07-21 NOTE — PLAN OF CARE
Problem: Pain:  Description: Pain management should include both nonpharmacologic and pharmacologic interventions. Goal: Control of acute pain  Description: Control of acute pain  Outcome: Ongoing     Problem:  Activity Intolerance:  Goal: Ability to tolerate increased activity will improve  Description: Ability to tolerate increased activity will improve  Outcome: Ongoing     Problem: Anxiety/Stress:  Goal: Level of anxiety will decrease  Description: Level of anxiety will decrease  Outcome: Ongoing

## 2021-07-21 NOTE — PROGRESS NOTES
Patient:  Mehran Azar  YOB: 1955   Date of Service:  07/21/21      Urology Attending Daily Progress Note    Chief complaint: \"feeling great\"    Interval HPI:  The patient did well overnight. Pain is controlled with medications. Tolerating diet. Denies nausea, vomiting, fevers, or chest pain. Ambulating. Objective:    No data found. I/O last 3 completed shifts: In: 1104.2 [P.O.:700; IV Piggyback:404.2]  Out: 1250 [Urine:1250]     Physical Exam:   Constitutional: comfortable in hospital bed, no acute distress  Abdomen: soft, nontender, no guarding, incisions c/d/i  Genitourinary: urethal helms out  Psych: normal mood and affect, appropriately answers questions   Skin, extremities: stable, exposed skin intact, no digital cyanosis     Labs:     No results found for: PSA  Lab Results   Component Value Date    CREATININE 1.0 07/21/2021    CREATININE 1.0 07/20/2021    CREATININE 1.0 07/14/2021     Lab Results   Component Value Date    COLORU Yellow 07/14/2021    NITRU Negative 07/14/2021    GLUCOSEU 100 07/14/2021    KETUA Negative 07/14/2021    UROBILINOGEN 0.2 07/14/2021    BILIRUBINUR Negative 07/14/2021    BILIRUBINUR neg 05/13/2021     Lab Results   Component Value Date    WBC 7.7 07/21/2021    HGB 9.2 (L) 07/21/2021    HCT 26.7 (L) 07/21/2021    MCV 83.4 07/21/2021     07/21/2021       Radiology:  \"Imaging was independently reviewed by myself and I agree with the radiology interpretation    Assessment:  Mehran Azar is a 74 y.o. female who is admitted after robotic left nephrectomy on 7/19    Plan:  -- Diet - regular  -- helms out and voiding well  -- encourage ambulation   -- continue sequential compression devices for DVT prophylaxis  -- Disposition - DC today    Corinne Crocker, MD  [facility]

## 2021-07-22 ENCOUNTER — TELEPHONE (OUTPATIENT)
Dept: FAMILY MEDICINE CLINIC | Age: 66
End: 2021-07-22

## 2021-07-22 NOTE — TELEPHONE ENCOUNTER
[facility] Transitions Initial Follow Up Call    Outreach made within 2 business days of discharge: Yes    Patient: Saleem Gaitan Patient : 1955   MRN: [id number]  Reason for Admission: There are no discharge diagnoses documented for the most recent discharge. Discharge Date: 21       Spoke with: Left voicemail for patient to call office with questions, concerns and to schedule HFU. Discharge department/facility: [facility]    Scheduled appointment with PCP within 7-14 days    Follow Up  No future appointments.     [place], [place]

## 2021-07-22 NOTE — PROGRESS NOTES
Physician Progress Note      PATIENT:               Shirlene Sullivan  CSN #:                  [id number]  :                       1955  ADMIT DATE:       2021 8:20 AM  DISCH DATE:        2021 3:00 PM  RESPONDING  PROVIDER #:        Denys Peter MD          QUERY TEXT:    Pt admitted with renal mass. Noted of BMI of 19.4. If possible, please   document in progress notes and discharge summary:    The medical record reflects the following:  Risk Factors: chronic conditions  Clinical Indicators: BMI 19.4  Treatment: daily weights, supportive care    Thank you,  Valentino Aguillon, RN, [facility]  [phone]  Options provided:  -- Pt is underweight with BMI of 19.4 is clinically significant  -- BMI is clinically insignificant  -- Other - I will add my own diagnosis  -- Disagree - Not applicable / Not valid  -- Disagree - Clinically unable to determine / Unknown  -- Refer to Clinical Documentation Reviewer    PROVIDER RESPONSE TEXT:    This patient's BMI is clinically insignificant.     Query created by: Dana Escobar on 2021 10:08 AM      Electronically signed by:  Denys Peter MD 2021 7:44 PM

## 2021-07-27 ENCOUNTER — VIRTUAL VISIT (OUTPATIENT)
Dept: FAMILY MEDICINE CLINIC | Age: 66
End: 2021-07-27
Payer: COMMERCIAL

## 2021-07-27 ENCOUNTER — NURSE TRIAGE (OUTPATIENT)
Dept: OTHER | Facility: CLINIC | Age: 66
End: 2021-07-27

## 2021-07-27 DIAGNOSIS — R11.0 NAUSEA WITHOUT VOMITING: Primary | ICD-10-CM

## 2021-07-27 PROCEDURE — G8427 DOCREV CUR MEDS BY ELIG CLIN: HCPCS | Performed by: NURSE PRACTITIONER

## 2021-07-27 PROCEDURE — 4040F PNEUMOC VAC/ADMIN/RCVD: CPT | Performed by: NURSE PRACTITIONER

## 2021-07-27 PROCEDURE — 1090F PRES/ABSN URINE INCON ASSESS: CPT | Performed by: NURSE PRACTITIONER

## 2021-07-27 PROCEDURE — 3017F COLORECTAL CA SCREEN DOC REV: CPT | Performed by: NURSE PRACTITIONER

## 2021-07-27 PROCEDURE — 1036F TOBACCO NON-USER: CPT | Performed by: NURSE PRACTITIONER

## 2021-07-27 PROCEDURE — G8400 PT W/DXA NO RESULTS DOC: HCPCS | Performed by: NURSE PRACTITIONER

## 2021-07-27 PROCEDURE — G8420 CALC BMI NORM PARAMETERS: HCPCS | Performed by: NURSE PRACTITIONER

## 2021-07-27 PROCEDURE — 99213 OFFICE O/P EST LOW 20 MIN: CPT | Performed by: NURSE PRACTITIONER

## 2021-07-27 PROCEDURE — 1111F DSCHRG MED/CURRENT MED MERGE: CPT | Performed by: NURSE PRACTITIONER

## 2021-07-27 PROCEDURE — 1123F ACP DISCUSS/DSCN MKR DOCD: CPT | Performed by: NURSE PRACTITIONER

## 2021-07-27 RX ORDER — PROMETHAZINE HYDROCHLORIDE 25 MG/1
25 TABLET ORAL 3 TIMES DAILY PRN
Qty: 21 TABLET | Refills: 0 | Status: SHIPPED | OUTPATIENT
Start: 2021-07-27 | End: 2021-08-10 | Stop reason: SDUPTHER

## 2021-07-27 NOTE — TELEPHONE ENCOUNTER
Received call from Nati Mendoza at [facility] with [employer]. Brief description of triage: Patient calling for concerns for worsening nausea since surgery on 07/19/2021. Triage indicates for patient to see today or tomorrow in office. Patient wants to be seen today or to have a prescription for anti-nausea medication sent to her pharmacy. She states she can't come in for an appointment because she doesn't drive and her  is at work. Offered her a virtual appointment if available. If no appointment available, will have office reach out to her for further assistance. If any worsening of symptoms, go to [facility] for evaluation. Care advice provided, patient verbalizes understanding; denies any other questions or concerns; instructed to call back for any new or worsening symptoms. Writer provided warm transfer to [facility] at [facility] for appointment scheduling. Attention Provider: Thank you for allowing me to participate in the care of your patient. The patient was connected to triage in response to information provided to the [facility]. Please do not respond through this encounter as the response is not directed to a shared pool. Reason for Disposition   Patient wants to be seen    Answer Assessment - Initial Assessment Questions  1. NAUSEA SEVERITY: \"How bad is the nausea? \" (e.g., mild, moderate, severe; dehydration, weight loss)    - MILD: loss of appetite without change in eating habits    - MODERATE: decreased oral intake without significant weight loss, dehydration, or malnutrition    - SEVERE: inadequate caloric or fluid intake, significant weight loss, symptoms of dehydration      Moderate    2. ONSET: \"When did the nausea begin? \"      Since surgery, worsening over the past couple days    3. VOMITING: \"Any vomiting? \" If so, ask: \"How many times today? \"      Just dry heaves    4. RECURRENT SYMPTOM: \"Have you had nausea before? \" If so, ask: \"When was the last time?\" \"What happened that time? \"      No, not this bad, does have issues with anesthesia    5. CAUSE: \"What do you think is causing the nausea? \"      Unsure    6. PREGNANCY: \"Is there any chance you are pregnant? \" (e.g., unprotected intercourse, missed birth control pill, broken condom)      n/a    Protocols used: NAUSEA-ADULT-OH

## 2021-07-27 NOTE — PATIENT INSTRUCTIONS
Provided prescription for promethazine 25 mg 3 times daily as needed for nausea. This medication may make you tired.     Continue with adequate hydration with noncaffeinated beverages    Brat diet (bananas, rice, applesauce, toast) and advance slowly as tolerated    Follow-up for any new or worsening of symptoms

## 2021-07-27 NOTE — PROGRESS NOTES
Teodora Amato (:  1955) is a 74 y.o. female,Established patient, here for evaluation of the following chief complaint(s): Nausea         ASSESSMENT/PLAN:  1. Nausea without vomiting    Short-term prescription of promethazine for as needed usecautioned on sedating side effects  Focus on fluid intake which she seems to be doing quite well with discussed brat diet and advancing as tolerated      Return if symptoms worsen or fail to improve. SUBJECTIVE/OBJECTIVE:  HPI  Is seen virtually today. [place]. She complains of nausea which started since she underwent a left nephrectomy on 2021. She feels nauseated almost constantly. Denies any vomiting. Postoperative pain is improving. No diarrhea or constipation. No fever. Has been drinking plenty of fluids but more difficulty with food intake. She was able to eat a half of a roast beef sandwich last evening and this morning had a cup of yogurt with blueberries. Blood pressure has been well controlled this morning it was 112/66 in her left arm and 118/80 in her right arm. Requesting nausea medication.     Review of Systems    Patient-Reported Vitals 2021   Patient-Reported Weight 105lb   Patient-Reported Height 5'   Patient-Reported Systolic 939   Patient-Reported Diastolic 87   Patient-Reported Temperature -        Physical Exam    [INSTRUCTIONS:  \"[x]\" Indicates a positive item  \"[]\" Indicates a negative item  -- DELETE ALL ITEMS NOT EXAMINED]    Constitutional: [x] Appears well-developed and well-nourished [x] No apparent distress      [] Abnormal -     Mental status: [x] Alert and awake  [x] Oriented to person/place/time [x] Able to follow commands    [] Abnormal -     Eyes:   EOM    [x]  Normal    [] Abnormal -   Sclera  [x]  Normal    [] Abnormal -          Discharge [x]  None visible   [] Abnormal -     HENT: [x] Normocephalic, atraumatic  [] Abnormal -   [] Mouth/Throat: Mucous membranes are moist    External Ears [x] Normal  [] Abnormal -    Neck: [x] No visualized mass [] Abnormal -     Pulmonary/Chest: [x] Respiratory effort normal   [x] No visualized signs of difficulty breathing or respiratory distress        [] Abnormal -      Musculoskeletal:   [x] Normal gait with no signs of ataxia         [x] Normal range of motion of neck        [] Abnormal -     Neurological:        [x] No Facial Asymmetry (Cranial nerve 7 motor function) (limited exam due to video visit)          [x] No gaze palsy        [] Abnormal -          Skin:        [x] No significant exanthematous lesions or discoloration noted on facial skin         [] Abnormal -            Psychiatric:       [x] Normal Affect [] Abnormal -        [x] No Hallucinations    Other pertinent observable physical exam findings:-          On this date 7/27/2021 I have spent 20 minutes reviewing previous notes, test results and face to face (virtual) with the patient discussing the diagnosis and importance of compliance with the treatment plan as well as documenting on the day of the visitAntonio Joselito Livingston, was evaluated through a synchronous (real-time) audio-video encounter. The patient (or guardian if applicable) is aware that this is a billable service. Verbal consent to proceed has been obtained within the past 12 months. The visit was conducted pursuant to the emergency declaration under the [address] [place] [address] authority and the [employer] General Act. Patient identification was verified, and a caregiver was present when appropriate. The patient was located in a state where the provider was credentialed to provide care. An electronic signature was used to authenticate this note.     --LEELEE Lechuga - CNP

## 2021-08-10 ENCOUNTER — VIRTUAL VISIT (OUTPATIENT)
Dept: FAMILY MEDICINE CLINIC | Age: 66
End: 2021-08-10
Payer: COMMERCIAL

## 2021-08-10 DIAGNOSIS — D64.9 ANEMIA, UNSPECIFIED TYPE: ICD-10-CM

## 2021-08-10 DIAGNOSIS — Z12.31 ENCOUNTER FOR SCREENING MAMMOGRAM FOR MALIGNANT NEOPLASM OF BREAST: ICD-10-CM

## 2021-08-10 DIAGNOSIS — Z12.11 SCREEN FOR COLON CANCER: ICD-10-CM

## 2021-08-10 DIAGNOSIS — Z90.5 S/P NEPHRECTOMY: Primary | ICD-10-CM

## 2021-08-10 DIAGNOSIS — I10 ESSENTIAL HYPERTENSION: ICD-10-CM

## 2021-08-10 DIAGNOSIS — R11.0 NAUSEA: ICD-10-CM

## 2021-08-10 PROCEDURE — 1090F PRES/ABSN URINE INCON ASSESS: CPT | Performed by: FAMILY MEDICINE

## 2021-08-10 PROCEDURE — G8400 PT W/DXA NO RESULTS DOC: HCPCS | Performed by: FAMILY MEDICINE

## 2021-08-10 PROCEDURE — 99214 OFFICE O/P EST MOD 30 MIN: CPT | Performed by: FAMILY MEDICINE

## 2021-08-10 PROCEDURE — 1111F DSCHRG MED/CURRENT MED MERGE: CPT | Performed by: FAMILY MEDICINE

## 2021-08-10 PROCEDURE — 1123F ACP DISCUSS/DSCN MKR DOCD: CPT | Performed by: FAMILY MEDICINE

## 2021-08-10 PROCEDURE — 3017F COLORECTAL CA SCREEN DOC REV: CPT | Performed by: FAMILY MEDICINE

## 2021-08-10 PROCEDURE — G8427 DOCREV CUR MEDS BY ELIG CLIN: HCPCS | Performed by: FAMILY MEDICINE

## 2021-08-10 PROCEDURE — 4040F PNEUMOC VAC/ADMIN/RCVD: CPT | Performed by: FAMILY MEDICINE

## 2021-08-10 RX ORDER — PROMETHAZINE HYDROCHLORIDE 25 MG/1
25 TABLET ORAL 2 TIMES DAILY PRN
Qty: 21 TABLET | Refills: 0 | Status: SHIPPED | OUTPATIENT
Start: 2021-08-10 | End: 2022-09-29 | Stop reason: SDUPTHER

## 2021-08-10 NOTE — PROGRESS NOTES
TELEHEALTH EVALUATION -- Audio/Visual (During FSBMP-14 public health emergency)    HPI:  Kacey Crum (:  1955) is a 74 y.o. female,  here for evaluation of the following chief complaint(s):  Blood Pressure Check and Other (loss of appetite)      ASSESSMENT/PLAN:   Diagnosis Orders   1. S/p nephrectomy  MARISA TIFFANY DIGITAL SCREEN BILATERAL   2. Nausea  MARISA TIFFANY DIGITAL SCREEN BILATERAL   3. Essential hypertension  MARISA TIFFANY DIGITAL SCREEN BILATERAL   4. Anemia, unspecified type  MARISA TIFFANY DIGITAL SCREEN BILATERAL   5. Screen for colon cancer  Cologuard    MARISA TIFFANY DIGITAL SCREEN BILATERAL   6. Encounter for screening mammogram for malignant neoplasm of breast  MARISA TIFFANY DIGITAL SCREEN BILATERAL   Diane Cole was seen today for blood pressure check and other. Diagnoses and all orders for this visit:    S/p nephrectomy  Improving gaining more strength week by week  Nausea  -     promethazine (PHENERGAN) 25 MG tablet; Take 1 tablet by mouth 2 times daily as needed for Nausea    Essential hypertension  Improving, ok to continue the lower dose of metoprolol and continue to monitor bp, if increases, we can increase the metoprolol as needed. Anemia, unspecified type  We will continue to watch, she will follow up with oncology to go over path report and any treatment recommendations. Screen for colon cancer  -     Cologuard; Future  Encounter for screening mammogram for malignant neoplasm of breast  -     MARISA TIFFANY DIGITAL SCREEN BILATERAL; Future            SUBJECTIVE/OBJECTIVE:  HPI  bp now 115/72, 122/82  She reduced metoprolol, now taking 25mg a day ( was on 75mg )   Decreased appetitive, taking boost a day. He has some nausea and the promethazine helps. She is taking a half a pill once a day. She is able to get up from walk and tolerates that. She is tired. Her CBC did show some anemia and this can be contributing to her tiredness.   She does have a follow-up with oncology next week to go over the path report and next steps. And she will follow-up with the urologist in 2 more weeks. Had left nephrectomy on 7/19/21 and  surgery report showed margins are clear. Review of Systems   As above  Allergic/Immunologic: Negative for immunocompromised state. Psychiatric/Behavioral: Negative for agitation, behavioral problems and confusion. Physical Exam    Constitutional: [x] Appears well-developed and well-nourished [x] No apparent distress      [] Abnormal-   Mental status  [x] Alert and awake  [x] Oriented to person/place/time [x]Able to follow commands      Eyes:  EOM    [x]  Normal  [] Abnormal-  Sclera  [x]  Normal  [] Abnormal -         Discharge [x]  None visible  [] Abnormal -    HENT:   [x] Normocephalic, atraumatic.   [] Abnormal   [] Mouth/Throat: Mucous membranes are moist.     External Ears [x] Normal  [] Abnormal-     Neck: [x] No visualized mass     Pulmonary/Chest: [x] Respiratory effort normal.  [x] No visualized signs of difficulty breathing or respiratory distress        [] Abnormal-    Able to speak in full sentences without difficulty  Musculoskeletal:   [] Normal gait with no signs of ataxia         [x] Normal range of motion of neck        [] Abnormal-       Neurological:        [x] No Facial Asymmetry (Cranial nerve 7 motor function) (limited exam to video visit)          [x] No gaze palsy        [] Abnormal-         Skin:        [x] No significant exanthematous lesions or discoloration noted on facial skin         [] Abnormal-            Psychiatric:       [x] Normal Affect [] No Hallucinations        [] Abnormal-   Judgment, behavior, thought and mood are normal.        30 min  Time spent today included for this patient visit includes time spent preparing to see the patient  Including review of tests, labs and imaging,   revewing previous history and recent encounters,   obtaining and/or reviewing separately obtained history in care everywhere or record,   performing a medically

## 2021-08-13 ENCOUNTER — TELEPHONE (OUTPATIENT)
Dept: FAMILY MEDICINE CLINIC | Age: 66
End: 2021-08-13

## 2021-08-13 NOTE — TELEPHONE ENCOUNTER
----- Message from [address] sent at 8/13/2021 12:28 PM EDT -----  Subject: Message to Provider    QUESTIONS  Information for Provider? Patient is concerned about her blood pressure   dropping, wants to schedule post-op follow up with Dr. William Edmond, best   contact number [phone] messages okay.   ---------------------------------------------------------------------------  --------------  Tori MONSIVAIS  What is the best way for the office to contact you? OK to leave message on   voicemail  Preferred Call Back Phone Number? [phone]  ---------------------------------------------------------------------------  --------------  SCRIPT ANSWERS  Relationship to Patient? Self  (Is the patient requesting to be seen urgently for their symptoms?)? No  Is this follow up request related to routine Diabetes Management? No  Are you having any new concerns about your existing condition?  Yes

## 2021-08-18 ENCOUNTER — OFFICE VISIT (OUTPATIENT)
Dept: FAMILY MEDICINE CLINIC | Age: 66
End: 2021-08-18
Payer: COMMERCIAL

## 2021-08-18 VITALS
DIASTOLIC BLOOD PRESSURE: 70 MMHG | HEIGHT: 60 IN | BODY MASS INDEX: 21.79 KG/M2 | TEMPERATURE: 97.1 F | WEIGHT: 111 LBS | OXYGEN SATURATION: 96 % | SYSTOLIC BLOOD PRESSURE: 114 MMHG | HEART RATE: 84 BPM

## 2021-08-18 DIAGNOSIS — D44.7 PARAGANGLIOMA (HCC): ICD-10-CM

## 2021-08-18 DIAGNOSIS — F39 MOOD DISORDER (HCC): Primary | ICD-10-CM

## 2021-08-18 DIAGNOSIS — I10 ESSENTIAL HYPERTENSION: ICD-10-CM

## 2021-08-18 DIAGNOSIS — Z90.5 S/P NEPHRECTOMY: ICD-10-CM

## 2021-08-18 PROBLEM — D41.02 NEOPLASM OF UNCERTAIN BEHAVIOR OF LEFT KIDNEY: Status: RESOLVED | Noted: 2021-07-19 | Resolved: 2021-08-18

## 2021-08-18 PROCEDURE — 4040F PNEUMOC VAC/ADMIN/RCVD: CPT | Performed by: NURSE PRACTITIONER

## 2021-08-18 PROCEDURE — G8400 PT W/DXA NO RESULTS DOC: HCPCS | Performed by: NURSE PRACTITIONER

## 2021-08-18 PROCEDURE — 1111F DSCHRG MED/CURRENT MED MERGE: CPT | Performed by: NURSE PRACTITIONER

## 2021-08-18 PROCEDURE — 3017F COLORECTAL CA SCREEN DOC REV: CPT | Performed by: NURSE PRACTITIONER

## 2021-08-18 PROCEDURE — 1123F ACP DISCUSS/DSCN MKR DOCD: CPT | Performed by: NURSE PRACTITIONER

## 2021-08-18 PROCEDURE — G8420 CALC BMI NORM PARAMETERS: HCPCS | Performed by: NURSE PRACTITIONER

## 2021-08-18 PROCEDURE — 1090F PRES/ABSN URINE INCON ASSESS: CPT | Performed by: NURSE PRACTITIONER

## 2021-08-18 PROCEDURE — 1036F TOBACCO NON-USER: CPT | Performed by: NURSE PRACTITIONER

## 2021-08-18 PROCEDURE — 99213 OFFICE O/P EST LOW 20 MIN: CPT | Performed by: NURSE PRACTITIONER

## 2021-08-18 PROCEDURE — G8427 DOCREV CUR MEDS BY ELIG CLIN: HCPCS | Performed by: NURSE PRACTITIONER

## 2021-08-18 ASSESSMENT — ENCOUNTER SYMPTOMS
VOMITING: 0
COUGH: 0
SHORTNESS OF BREATH: 0
DIARRHEA: 0
NAUSEA: 0

## 2021-08-18 NOTE — PROGRESS NOTES
2021     Chief Complaint   Patient presents with    Follow-up     after kidney surgery     Janice Garza (:  1955) is a 74 y.o. female, here for evaluation of the following medical concerns:    HPI    Status post right radical nephrectomy on 2021. Postoperatively was having difficulties with nausea and decreased appetite. This has improved, and taking promethazine very rarely. 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 She does have an follow-up appointment with oncology in 2 weeks and her nephrologist in about 2 weeks as well. Has noticed since surgery that hypertension and tachycardia have resolved as well as sweating. These are all consistent with symptoms associated with paragangliomas. Wants to wean off of the Zoloft at some point but wants to do this very slowly, she was on SSRIs in her 35s and had a really hard time weaning off of those. She is only been on the Zoloft her 2 to 3 months. Reduced her dose of metoprolol from 75 mg to 25 mg daily but still experiencing lower blood pressure readings at home. Will plan to stop metoprolol today. Review of Systems   Constitutional: Negative for chills, fatigue and fever. Respiratory: Negative for cough and shortness of breath. Cardiovascular: Negative for chest pain and leg swelling. Gastrointestinal: Negative for diarrhea, nausea and vomiting. Neurological: Negative for dizziness and headaches. All other systems reviewed and are negative. Prior to Visit Medications    Medication Sig Taking?  Authorizing Provider   sertraline (ZOLOFT) 50 MG tablet Take 1 tablet by mouth daily Yes Magdi Marin APRN - CNP   promethazine (PHENERGAN) 25 MG tablet Take 1 tablet by mouth 2 times daily as needed for Nausea Yes Maame De Jesus DO   sennosides-docusate sodium (SENOKOT-S) 8.6-50 MG tablet Take 2 tablets by mouth daily UNTIL REGULAR SOFT BOWEL MOVEMENTS Yes J Carlos Trotter MD   Magnesium 400 MG TABS Take 400 mg by mouth daily Yes Historical Provider, MD   VITAMIN A PO Take 25,000 Units by mouth daily Yes Historical Provider, MD   zinc 50 MG TABS tablet Take 50 mg by mouth daily Yes Historical Provider, MD   Cholecalciferol (VITAMIN D3) 125 MCG (5000 UT) TABS Take by mouth Yes Historical Provider, MD   amLODIPine (NORVASC) 5 MG tablet TAKE ONE TABLET BY MOUTH TWICE A DAY Yes Monica Fletcher MD   potassium chloride (KLOR-CON) 10 MEQ extended release tablet  Yes Historical Provider, MD   Ascorbic Acid (VITAMIN C CR) 1000 MG TBCR Take  by mouth 2 times daily. Yes Historical Provider, MD      Social History     Tobacco Use    Smoking status: Former Smoker     Packs/day: 0.50     Years: 10.00     Pack years: 5.00     Types: Cigarettes     Quit date: 2008     Years since quittin.6    Smokeless tobacco: Never Used    Tobacco comment: quit [id number]   Substance Use Topics    Alcohol use: Not Currently     Comment: occ        Vitals:    21 1055   BP: 114/70   Site: Right Upper Arm   Position: Sitting   Cuff Size: Medium Adult   Pulse: 84   Temp: 97.1 °F (36.2 °C)   SpO2: 96%   Weight: 111 lb (50.3 kg)   Height: 5' (1.524 m)     Estimated body mass index is 21.68 kg/m² as calculated from the following:    Height as of this encounter: 5' (1.524 m). Weight as of this encounter: 111 lb (50.3 kg). Physical Exam  Vitals and nursing note reviewed. Constitutional:       General: She is not in acute distress. Appearance: Normal appearance. She is well-developed. She is not ill-appearing, toxic-appearing or diaphoretic. HENT:      Head: Normocephalic and atraumatic. Eyes:      Extraocular Movements: Extraocular movements intact. Pupils: Pupils are equal, round, and reactive to light. Cardiovascular:      Rate and Rhythm: Normal rate and regular rhythm. Heart sounds: Normal heart sounds, S1 normal and S2 normal. No murmur heard. No friction rub. No gallop.     Pulmonary:      Effort: Pulmonary effort is normal. No respiratory distress. Breath sounds: Normal breath sounds. Neurological:      General: No focal deficit present. Mental Status: She is alert and oriented to person, place, and time. Mental status is at baseline. Cranial Nerves: No cranial nerve deficit. Psychiatric:         Speech: Speech normal.         ASSESSMENT/PLAN:  1. Mood disorder (HCC)-uncontrolled, wants to get off of Zoloft and we discussed weaning down off of this. - sertraline (ZOLOFT) 50 MG tablet; Take 1 tablet by mouth daily  Dispense: 30 tablet; Refill: 1    2. Essential hypertension-trolled today, with some episodes of lower blood pressure at home. We will have the metoprolol 25 mg and discontinue amlodipine for now    3. S/p nephrectomy-recovering well    4. Paraganglioma (HCC)-Per oncology/urology      An electronic signature was used to authenticate this note.     --LEELEE Arellano - CNP on 8/18/2021 at 4:09 PM

## 2021-08-25 LAB
ALBUMIN SERPL-MCNC: 4.3 G/DL (ref 3.5–5.2)
ALP BLD-CCNC: 103 U/L (ref 42–98)
ALT SERPL-CCNC: 19 U/L (ref 0–34)
AST SERPL-CCNC: 17 U/L (ref 5–34)
BILIRUB SERPL-MCNC: 0.2 MG/DL (ref 0.1–1.2)
BUN / CREAT RATIO: 34 RATIO (ref 9–28)
BUN BLDV-MCNC: 34 MG/DL (ref 6–20)
CALCIUM SERPL-MCNC: 9.3 MG/DL (ref 8.6–10.3)
CHLORIDE BLD-SCNC: 102 MMOL/L (ref 97–107)
CO2: 23 MMOL/L (ref 21–31)
CREAT SERPL-MCNC: 1 MG/DL (ref 0.6–1)
GLUCOSE: 90 MG/DL (ref 74–106)
POTASSIUM SERPL-SCNC: 4.9 MMOL/L (ref 3.6–5)
SODIUM BLD-SCNC: 135 MMOL/L (ref 135–145)
TOTAL PROTEIN: 6.9 G/DL (ref 6.4–8.3)

## 2021-08-31 ENCOUNTER — TELEPHONE (OUTPATIENT)
Dept: FAMILY MEDICINE CLINIC | Age: 66
End: 2021-08-31

## 2021-08-31 DIAGNOSIS — D44.7 PARAGANGLIOMA (HCC): Primary | ICD-10-CM

## 2021-08-31 NOTE — TELEPHONE ENCOUNTER
Spoke to patient and she said she was given Dr. Eli Zamorano name for Endo but they said she needs an actual referral. So she said could Dr. Cristal Dillard just do the referral. Pended it but I wasn't sure what she was being referred for.

## 2021-09-01 ENCOUNTER — OFFICE VISIT (OUTPATIENT)
Dept: FAMILY MEDICINE CLINIC | Age: 66
End: 2021-09-01
Payer: COMMERCIAL

## 2021-09-01 VITALS
DIASTOLIC BLOOD PRESSURE: 82 MMHG | WEIGHT: 117 LBS | HEIGHT: 60 IN | OXYGEN SATURATION: 98 % | SYSTOLIC BLOOD PRESSURE: 140 MMHG | BODY MASS INDEX: 22.97 KG/M2 | HEART RATE: 99 BPM

## 2021-09-01 DIAGNOSIS — I10 ESSENTIAL HYPERTENSION: ICD-10-CM

## 2021-09-01 DIAGNOSIS — D64.9 ANEMIA, UNSPECIFIED TYPE: ICD-10-CM

## 2021-09-01 DIAGNOSIS — N39.0 URINARY TRACT INFECTION WITHOUT HEMATURIA, SITE UNSPECIFIED: Primary | ICD-10-CM

## 2021-09-01 LAB
ANION GAP SERPL CALCULATED.3IONS-SCNC: 13 MMOL/L (ref 3–16)
BASOPHILS ABSOLUTE: 0 K/UL (ref 0–0.2)
BASOPHILS RELATIVE PERCENT: 0.5 %
BILIRUBIN, POC: NORMAL
BLOOD URINE, POC: NORMAL
BUN BLDV-MCNC: 27 MG/DL (ref 7–20)
CALCIUM SERPL-MCNC: 9.3 MG/DL (ref 8.3–10.6)
CHLORIDE BLD-SCNC: 103 MMOL/L (ref 99–110)
CLARITY, POC: NORMAL
CO2: 22 MMOL/L (ref 21–32)
COLOR, POC: NORMAL
CREAT SERPL-MCNC: 1.1 MG/DL (ref 0.6–1.2)
EOSINOPHILS ABSOLUTE: 0.1 K/UL (ref 0–0.6)
EOSINOPHILS RELATIVE PERCENT: 1.1 %
GFR AFRICAN AMERICAN: 60
GFR NON-AFRICAN AMERICAN: 49
GLUCOSE BLD-MCNC: 90 MG/DL (ref 70–99)
GLUCOSE URINE, POC: NORMAL
HCT VFR BLD CALC: 34.8 % (ref 36–48)
HEMOGLOBIN: 11.7 G/DL (ref 12–16)
KETONES, POC: NORMAL
LEUKOCYTE EST, POC: NORMAL
LYMPHOCYTES ABSOLUTE: 2.4 K/UL (ref 1–5.1)
LYMPHOCYTES RELATIVE PERCENT: 37.4 %
MCH RBC QN AUTO: 28.9 PG (ref 26–34)
MCHC RBC AUTO-ENTMCNC: 33.6 G/DL (ref 31–36)
MCV RBC AUTO: 85.8 FL (ref 80–100)
MONOCYTES ABSOLUTE: 0.5 K/UL (ref 0–1.3)
MONOCYTES RELATIVE PERCENT: 7.7 %
NEUTROPHILS ABSOLUTE: 3.4 K/UL (ref 1.7–7.7)
NEUTROPHILS RELATIVE PERCENT: 53.3 %
NITRITE, POC: NORMAL
PDW BLD-RTO: 16.2 % (ref 12.4–15.4)
PH, POC: 7
PLATELET # BLD: 422 K/UL (ref 135–450)
PMV BLD AUTO: 7 FL (ref 5–10.5)
POTASSIUM SERPL-SCNC: 5 MMOL/L (ref 3.5–5.1)
PROTEIN, POC: NORMAL
RBC # BLD: 4.05 M/UL (ref 4–5.2)
SODIUM BLD-SCNC: 138 MMOL/L (ref 136–145)
SPECIFIC GRAVITY, POC: 1.01
UROBILINOGEN, POC: 0.2
WBC # BLD: 6.4 K/UL (ref 4–11)

## 2021-09-01 PROCEDURE — G8427 DOCREV CUR MEDS BY ELIG CLIN: HCPCS | Performed by: NURSE PRACTITIONER

## 2021-09-01 PROCEDURE — 1123F ACP DISCUSS/DSCN MKR DOCD: CPT | Performed by: NURSE PRACTITIONER

## 2021-09-01 PROCEDURE — 99213 OFFICE O/P EST LOW 20 MIN: CPT | Performed by: NURSE PRACTITIONER

## 2021-09-01 PROCEDURE — 1036F TOBACCO NON-USER: CPT | Performed by: NURSE PRACTITIONER

## 2021-09-01 PROCEDURE — 36415 COLL VENOUS BLD VENIPUNCTURE: CPT | Performed by: NURSE PRACTITIONER

## 2021-09-01 PROCEDURE — 3017F COLORECTAL CA SCREEN DOC REV: CPT | Performed by: NURSE PRACTITIONER

## 2021-09-01 PROCEDURE — G8420 CALC BMI NORM PARAMETERS: HCPCS | Performed by: NURSE PRACTITIONER

## 2021-09-01 PROCEDURE — 1090F PRES/ABSN URINE INCON ASSESS: CPT | Performed by: NURSE PRACTITIONER

## 2021-09-01 PROCEDURE — 4040F PNEUMOC VAC/ADMIN/RCVD: CPT | Performed by: NURSE PRACTITIONER

## 2021-09-01 PROCEDURE — G8400 PT W/DXA NO RESULTS DOC: HCPCS | Performed by: NURSE PRACTITIONER

## 2021-09-01 PROCEDURE — 81003 URINALYSIS AUTO W/O SCOPE: CPT | Performed by: NURSE PRACTITIONER

## 2021-09-01 ASSESSMENT — ENCOUNTER SYMPTOMS
NAUSEA: 1
DIARRHEA: 0
VOMITING: 0
COUGH: 0
SHORTNESS OF BREATH: 0

## 2021-09-01 NOTE — PROGRESS NOTES
Cholecalciferol (VITAMIN D3) 125 MCG (5000 UT) TABS Take by mouth Yes Historical Provider, MD   amLODIPine (NORVASC) 5 MG tablet TAKE ONE TABLET BY MOUTH TWICE A DAY Yes Caity Szymanski MD   potassium chloride (KLOR-CON) 10 MEQ extended release tablet  Yes Historical Provider, MD   Ascorbic Acid (VITAMIN C CR) 1000 MG TBCR Take  by mouth 2 times daily. Yes Historical Provider, MD        Social History     Tobacco Use    Smoking status: Former Smoker     Packs/day: 0.50     Years: 10.00     Pack years: 5.00     Types: Cigarettes     Quit date: 2008     Years since quittin.6    Smokeless tobacco: Never Used    Tobacco comment: quit [id number]   Substance Use Topics    Alcohol use: Not Currently     Comment: occ        Vitals:    21 1435 21 1445 21 1456   BP: (!) 170/90 (!) 156/88 (!) 140/82   Site: Right Upper Arm Left Upper Arm    Position: Sitting Sitting    Cuff Size: Medium Adult Medium Adult    Pulse: 99     SpO2: 98%     Weight: 117 lb (53.1 kg)     Height: 5' (1.524 m)       Estimated body mass index is 22.85 kg/m² as calculated from the following:    Height as of this encounter: 5' (1.524 m). Weight as of this encounter: 117 lb (53.1 kg). Physical Exam  Vitals and nursing note reviewed. Constitutional:       General: She is not in acute distress. Appearance: Normal appearance. She is well-developed. She is not ill-appearing, toxic-appearing or diaphoretic. HENT:      Head: Normocephalic and atraumatic. Eyes:      Extraocular Movements: Extraocular movements intact. Pupils: Pupils are equal, round, and reactive to light. Cardiovascular:      Rate and Rhythm: Normal rate and regular rhythm. Heart sounds: Normal heart sounds, S1 normal and S2 normal. No murmur heard. No friction rub. No gallop. Pulmonary:      Effort: Pulmonary effort is normal. No respiratory distress. Breath sounds: Normal breath sounds. No wheezing or rales.    Neurological: General: No focal deficit present. Mental Status: She is alert and oriented to person, place, and time. Mental status is at baseline. Cranial Nerves: No cranial nerve deficit. Psychiatric:         Speech: Speech normal.       ASSESSMENT/PLAN:  1. Urinary tract infection without hematuria, site unspecified  - POCT Urinalysis No Micro (Auto)    2. Essential hypertension  - BASIC METABOLIC PANEL    3. Anemia, unspecified type  - CBC WITH AUTO DIFFERENTIAL    Will send urine culture  Check BMP and CBC  Blood pressure elevated today but controlled at home  Continue current medication and home blood pressure monitoring    An electronic signature was used to authenticate this note.     --Kipp Opitz, APRN - CNP on 9/1/2021 at 3:14 PM

## 2021-09-02 LAB — URINE CULTURE, ROUTINE: NORMAL

## 2021-09-09 ENCOUNTER — APPOINTMENT (RX ONLY)
Dept: URBAN - METROPOLITAN AREA CLINIC 170 | Facility: CLINIC | Age: 66
Setting detail: DERMATOLOGY
End: 2021-09-09

## 2021-09-09 DIAGNOSIS — D22 MELANOCYTIC NEVI: ICD-10-CM | Status: STABLE

## 2021-09-09 DIAGNOSIS — L81.4 OTHER MELANIN HYPERPIGMENTATION: ICD-10-CM | Status: STABLE

## 2021-09-09 DIAGNOSIS — D18.0 HEMANGIOMA: ICD-10-CM | Status: STABLE

## 2021-09-09 DIAGNOSIS — L82.1 OTHER SEBORRHEIC KERATOSIS: ICD-10-CM | Status: STABLE

## 2021-09-09 PROBLEM — D22.5 MELANOCYTIC NEVI OF TRUNK: Status: ACTIVE | Noted: 2021-09-09

## 2021-09-09 PROBLEM — D18.01 HEMANGIOMA OF SKIN AND SUBCUTANEOUS TISSUE: Status: ACTIVE | Noted: 2021-09-09

## 2021-09-09 PROCEDURE — ? COUNSELING

## 2021-09-09 PROCEDURE — ? TREATMENT REGIMEN

## 2021-09-09 PROCEDURE — ? ADDITIONAL NOTES

## 2021-09-09 PROCEDURE — ? FULL BODY SKIN EXAM

## 2021-09-09 PROCEDURE — 99203 OFFICE O/P NEW LOW 30 MIN: CPT

## 2021-09-09 ASSESSMENT — LOCATION DETAILED DESCRIPTION DERM
LOCATION DETAILED: PERIUMBILICAL SKIN
LOCATION DETAILED: EPIGASTRIC SKIN
LOCATION DETAILED: LEFT SUPERIOR MEDIAL MIDBACK
LOCATION DETAILED: RIGHT MID-UPPER BACK

## 2021-09-09 ASSESSMENT — LOCATION SIMPLE DESCRIPTION DERM
LOCATION SIMPLE: ABDOMEN
LOCATION SIMPLE: LEFT LOWER BACK
LOCATION SIMPLE: RIGHT UPPER BACK

## 2021-09-09 ASSESSMENT — LOCATION ZONE DERM: LOCATION ZONE: TRUNK

## 2021-09-09 NOTE — PROCEDURE: MIPS QUALITY
Detail Level: Detailed
Quality 431: Preventive Care And Screening: Unhealthy Alcohol Use - Screening: Patient not identified as an unhealthy alcohol user when screened for unhealthy alcohol use using a systematic screening method
Quality 110: Preventive Care And Screening: Influenza Immunization: Influenza immunization was not ordered or administered, reason not given
Quality 226: Preventive Care And Screening: Tobacco Use: Screening And Cessation Intervention: Patient screened for tobacco use and is an ex/non-smoker
Quality 47: Advance Care Plan: Advance Care Planning discussed and documented in the medical record; patient did not wish or was not able to name a surrogate decision maker or provide an advance care plan.
Quality 111:Pneumonia Vaccination Status For Older Adults: Pneumococcal Vaccination not Administered or Previously Received, Reason not Otherwise Specified
Quality 130: Documentation Of Current Medications In The Medical Record: Current Medications Documented
Bladder non-tender and non-distended. Urine clear yellow.

## 2021-09-14 ENCOUNTER — TELEPHONE (OUTPATIENT)
Dept: FAMILY MEDICINE CLINIC | Age: 66
End: 2021-09-14

## 2021-09-14 NOTE — TELEPHONE ENCOUNTER
Pt got her lab results through my chart and has some questions. Her POCT states Blood, UA POC trace and she is asking what this means.     Please advise Dione Duarte- [phone]

## 2021-09-15 NOTE — TELEPHONE ENCOUNTER
Patient notified / I told the patient per NP [facility] that she said its nothing to be concerned about but if she is really concerned she can drop of a urine to be rechecked in two weeks

## 2021-09-15 NOTE — TELEPHONE ENCOUNTER
Patient notified / she said she didn't have any when she saw the Urologist. She wants to know if anything has to be done regarding this?

## 2021-09-15 NOTE — TELEPHONE ENCOUNTER
This is less blood than previously. She should be sure to hydrate well with water can be checked at her next office visit.

## 2021-10-13 ENCOUNTER — TELEPHONE (OUTPATIENT)
Dept: FAMILY MEDICINE CLINIC | Age: 66
End: 2021-10-13

## 2021-10-13 DIAGNOSIS — F39 MOOD DISORDER (HCC): ICD-10-CM

## 2021-10-13 RX ORDER — SERTRALINE HYDROCHLORIDE 25 MG/1
TABLET, FILM COATED ORAL
Qty: 15 TABLET | Refills: 3 | Status: SHIPPED | OUTPATIENT
Start: 2021-10-13 | Stop reason: SDUPTHER

## 2021-10-13 NOTE — TELEPHONE ENCOUNTER
----- Message from Elo Brito sent at 10/13/2021 11:31 AM EDT -----  Subject: Medication Problem    QUESTIONS  Name of Medication? sertraline (ZOLOFT) 50 MG tablet  Patient-reported dosage and instructions? 50 MG tablet - half a pill per   day  What question or problem do you have with the medication? Pt is trying   wean off of the medication. She is requesting a 25 MG tablet and she would   like to take half a pill per day. Please call pt to advise. Preferred Pharmacy? [address] [place] [address] [address] [place] [address]  Pharmacy phone number (if available)? [phone]  Additional Information for Provider?   ---------------------------------------------------------------------------  --------------  CALL BACK INFO  What is the best way for the office to contact you? OK to leave message on   voicemail  Preferred Call Back Phone Number? [phone]  ---------------------------------------------------------------------------  --------------  SCRIPT ANSWERS  Relationship to Patient?  Self

## 2021-10-28 DIAGNOSIS — F39 MOOD DISORDER (HCC): ICD-10-CM

## 2021-10-28 RX ORDER — SERTRALINE HYDROCHLORIDE 25 MG/1
TABLET, FILM COATED ORAL
Qty: 15 TABLET | Refills: 5 | Status: SHIPPED | OUTPATIENT
Start: 2021-10-28 | End: 2022-02-03 | Stop reason: SDUPTHER

## 2022-02-02 DIAGNOSIS — I10 ESSENTIAL HYPERTENSION: ICD-10-CM

## 2022-02-02 RX ORDER — AMLODIPINE BESYLATE 5 MG/1
TABLET ORAL
Qty: 60 TABLET | Refills: 5 | Status: SHIPPED | OUTPATIENT
Start: 2022-02-02 | End: 2022-08-09 | Stop reason: SDUPTHER

## 2022-08-08 DIAGNOSIS — I10 ESSENTIAL HYPERTENSION: ICD-10-CM

## 2022-08-09 RX ORDER — AMLODIPINE BESYLATE 5 MG/1
TABLET ORAL
Qty: 60 TABLET | Refills: 5 | Status: SHIPPED | OUTPATIENT
Start: 2022-08-09

## 2022-09-29 ENCOUNTER — OFFICE VISIT (OUTPATIENT)
Dept: FAMILY MEDICINE CLINIC | Age: 67
End: 2022-09-29
Payer: COMMERCIAL

## 2022-09-29 VITALS
BODY MASS INDEX: 28.07 KG/M2 | SYSTOLIC BLOOD PRESSURE: 128 MMHG | RESPIRATION RATE: 14 BRPM | WEIGHT: 143 LBS | TEMPERATURE: 97.3 F | HEIGHT: 60 IN | DIASTOLIC BLOOD PRESSURE: 60 MMHG | OXYGEN SATURATION: 97 % | HEART RATE: 120 BPM

## 2022-09-29 DIAGNOSIS — R11.0 NAUSEA: ICD-10-CM

## 2022-09-29 DIAGNOSIS — M79.651 PAIN OF RIGHT THIGH: Primary | ICD-10-CM

## 2022-09-29 PROCEDURE — 99213 OFFICE O/P EST LOW 20 MIN: CPT | Performed by: STUDENT IN AN ORGANIZED HEALTH CARE EDUCATION/TRAINING PROGRAM

## 2022-09-29 PROCEDURE — 1123F ACP DISCUSS/DSCN MKR DOCD: CPT | Performed by: STUDENT IN AN ORGANIZED HEALTH CARE EDUCATION/TRAINING PROGRAM

## 2022-09-29 RX ORDER — PROMETHAZINE HYDROCHLORIDE 12.5 MG/1
12.5 TABLET ORAL PRN
Qty: 10 TABLET | Refills: 0 | Status: SHIPPED | OUTPATIENT
Start: 2022-09-29 | End: 2022-09-30

## 2022-09-29 ASSESSMENT — ENCOUNTER SYMPTOMS
CONSTIPATION: 0
ABDOMINAL PAIN: 0
CHEST TIGHTNESS: 0
SHORTNESS OF BREATH: 0
COUGH: 0
WHEEZING: 0
NAUSEA: 1
VOMITING: 0
DIARRHEA: 0
BLOOD IN STOOL: 0

## 2022-09-29 ASSESSMENT — ANXIETY QUESTIONNAIRES
4. TROUBLE RELAXING: 0
GAD7 TOTAL SCORE: 0
7. FEELING AFRAID AS IF SOMETHING AWFUL MIGHT HAPPEN: 0
5. BEING SO RESTLESS THAT IT IS HARD TO SIT STILL: 0
IF YOU CHECKED OFF ANY PROBLEMS ON THIS QUESTIONNAIRE, HOW DIFFICULT HAVE THESE PROBLEMS MADE IT FOR YOU TO DO YOUR WORK, TAKE CARE OF THINGS AT HOME, OR GET ALONG WITH OTHER PEOPLE: NOT DIFFICULT AT ALL
3. WORRYING TOO MUCH ABOUT DIFFERENT THINGS: 0
6. BECOMING EASILY ANNOYED OR IRRITABLE: 0
2. NOT BEING ABLE TO STOP OR CONTROL WORRYING: 0
1. FEELING NERVOUS, ANXIOUS, OR ON EDGE: 0

## 2022-09-29 ASSESSMENT — PATIENT HEALTH QUESTIONNAIRE - PHQ9
SUM OF ALL RESPONSES TO PHQ QUESTIONS 1-9: 0
SUM OF ALL RESPONSES TO PHQ9 QUESTIONS 1 & 2: 0
SUM OF ALL RESPONSES TO PHQ QUESTIONS 1-9: 0
2. FEELING DOWN, DEPRESSED OR HOPELESS: 0
1. LITTLE INTEREST OR PLEASURE IN DOING THINGS: 0

## 2022-09-29 NOTE — PATIENT INSTRUCTIONS
How is meralgia paresthetica treated? -- Meralgia paresthetica usually goes away on its own within a few weeks or months. If your symptoms bother you, it might help to:  ? Avoid wearing tight belts or clothing with a tight waistband. These things can put pressure on the nerve that runs from your lower spine to your thigh. ?Consider losing weight if you are overweight. Your doctor or nurse can talk to you about healthy ways to lose weight. ?Take pain-relieving medicines such as acetaminophen (brand name: Tylenol)  If your symptoms last for longer than 1 or 2 months, tell your doctor or nurse. They might suggest trying other treatments, such as pain medicines or a shot of medicine to numb the area. Sometimes, surgery is recommended for people with severe symptoms, but this     LOOK OUT FOR   Pain  Stiffness  Limited range of motion and ability to bend your knee  Swelling of your knee and/or leg. Baker's cysts can also rupture causing swelling and redness in the lower leg.

## 2022-09-29 NOTE — PROGRESS NOTES
[address], [address] [place] [address]  Tel: [phone]      2022     Sissy Cardona (:  1955) is a 69 y.o. 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 Began 2-3 weeks ago, described as aching pain. Notable with walking, first few steps. Denies any pain with sitting or at rest.   Pain is mild to moderate. No pain with palpation. Denies any redness or swelling. Does not radiate. No back, calf or hip pain. History of  closed nondisplaced fracture of fifth metatarsal bone of left foot on 22 and had a boot on her left foot for 5-6 weeks, was removed 2 weeks ago. Had been ambulating in the boot. Denies any recent surgeries, or long periods of immobility. 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 Reports she recently put on a lot of weight, ~30 lbs in the last year after her kidney surgery. Denies any history of blood clots, or family history of clotting disease. Taking tylenol. Requesting Phenergan for nausea and anxiety. Declined any other medications, reports she uses 1/4 of the pill sparingly. Maybe 7 pills a year. No notable side effects, no dry mouth or constipation. Review of Systems   Constitutional:  Negative for diaphoresis, fatigue, fever and unexpected weight change. Eyes:  Negative for visual disturbance. Respiratory:  Negative for cough, chest tightness, shortness of breath and wheezing. Cardiovascular:  Negative for chest pain, palpitations and leg swelling. Gastrointestinal:  Positive for nausea. Negative for abdominal pain, blood in stool, constipation, diarrhea and vomiting. Genitourinary:  Negative for dysuria.    Musculoskeletal:  Negative for arthralgias and joint swelling. Skin:  Negative for rash. Neurological:  Negative for dizziness, weakness, light-headedness, numbness and headaches. Prior to Visit Medications    Medication Sig Taking? Authorizing Provider   promethazine (PHENERGAN) 12.5 MG tablet Take 1 tablet by mouth as needed for Nausea Use sparingly Yes Paris Echeverria MD   amLODIPine (NORVASC) 5 MG tablet TAKE ONE TABLET BY MOUTH TWICE A DAY Yes Meagan Michelle DO   Magnesium 400 MG TABS Take 400 mg by mouth daily Yes Historical Provider, MD   VITAMIN A PO Take 25,000 Units by mouth daily Yes Historical Provider, MD   zinc 50 MG TABS tablet Take 50 mg by mouth daily Yes Historical Provider, MD   Cholecalciferol (VITAMIN D3) 125 MCG (5000 UT) TABS Take by mouth Yes Historical Provider, MD   potassium chloride (KLOR-CON) 10 MEQ extended release tablet  Yes Historical Provider, MD   Ascorbic Acid ER 1000 MG TBCR Take  by mouth 2 times daily. Yes Historical Provider, MD   sertraline (ZOLOFT) 25 MG tablet 1/2 tablet per day  Meagan Michelle DO        Social History     Tobacco Use    Smoking status: Former     Packs/day: 0.50     Years: 10.00     Pack years: 5.00     Types: Cigarettes     Quit date: 2008     Years since quittin.7    Smokeless tobacco: Never    Tobacco comments:     quit [id number]   Substance Use Topics    Alcohol use: Not Currently     Comment: occ      Physical exam  /60 (Site: Left Upper Arm)   Pulse (!) 120   Temp 97.3 °F (36.3 °C) (Temporal)   Resp 14   Ht 5' (1.524 m)   Wt 143 lb (64.9 kg)   LMP 2011   SpO2 97%   BMI 27.93 kg/m²   /60 (Site: Left Upper Arm)   Pulse (!) 120   Temp 97.3 °F (36.3 °C) (Temporal)   Resp 14   Ht 5' (1.524 m)   Wt 143 lb (64.9 kg)   LMP 2011   SpO2 97%   BMI 27.93 kg/m²     Physical Exam  Constitutional:       General: She is not in acute distress. HENT:      Head: Normocephalic.       Nose: Nose normal.      Mouth/Throat:      Mouth: Mucous membranes are moist. Eyes:      Conjunctiva/sclera: Conjunctivae normal.      Pupils: Pupils are equal, round, and reactive to light. Cardiovascular:      Rate and Rhythm: Normal rate and regular rhythm. Pulses: Normal pulses. Heart sounds: Normal heart sounds. No murmur heard. Pulmonary:      Effort: Pulmonary effort is normal. No respiratory distress. Breath sounds: Normal breath sounds. No wheezing or rales. Chest:      Chest wall: No tenderness. Abdominal:      General: Abdomen is flat. Bowel sounds are normal. There is no distension. Palpations: Abdomen is soft. Tenderness: There is no abdominal tenderness. There is no right CVA tenderness or left CVA tenderness. Hernia: No hernia is present. There is no hernia in the left inguinal area or right inguinal area. Musculoskeletal:         General: No swelling, tenderness, deformity or signs of injury. Normal range of motion. Cervical back: Normal range of motion. Right hip: Normal. No tenderness, bony tenderness or crepitus. Normal range of motion. Normal strength. Left hip: Normal.      Right upper leg: Normal. No swelling, edema or deformity. Left upper leg: Normal.      Right lower leg: No swelling or tenderness. Left lower leg: No swelling. Comments: RIGHT LEG - NO swelling ,erythema , or palpable cord   Penelope's sign negative    Lymphadenopathy:      Cervical: No cervical adenopathy. Skin:     General: Skin is dry. Findings: No erythema or rash. Neurological:      General: No focal deficit present. Motor: No weakness. Gait: Gait normal.   Psychiatric:         Mood and Affect: Mood normal.         Behavior: Behavior normal.       ASSESSMENT/PLAN:  1.  Pain of right thigh  Most likely multifactorial pain in thigh may be Meralgia paresthetica from nerve compression from tight clothing , or hip flexor strain secondary to walking with boot on left foot vs muscle strain /spasm   Low suspicion for DVT , Well Score -1.  -  Advised patient to monitor for worsening pain, swelling and redness   - Discussed wearing looser clothing or losing weight to alleviate pressure. Provided  Exercises that reduce muscle tension and improve flexibility and strength may also help alleviate pain. - Most hip flexor strains can be treated at home with ice, over-the-counter pain relievers, heat, rest, and exercises. 2. Nausea  Requested Reglan for anxiety and nausea. Symptoms are rare. Discussed with patient that this medication is not recommended in the elderly due to anticholinergic effects and sedation. Patient agreed to use half dose less than 1 time a week  - promethazine (PHENERGAN) 12.5 MG tablet; Take 1 tablet by mouth as needed for Nausea Use sparingly  Dispense: 10 tablet; Refill: 0      Return if symptoms worsen or fail to improve. An electronic signature was used to authenticate this note.     --Cullen Orr MD on 9/29/2022 at 5:17 PM

## 2022-09-30 ENCOUNTER — TELEPHONE (OUTPATIENT)
Dept: FAMILY MEDICINE CLINIC | Age: 67
End: 2022-09-30

## 2022-09-30 RX ORDER — PROMETHAZINE HYDROCHLORIDE 12.5 MG/1
12.5 TABLET ORAL
Qty: 10 TABLET | Refills: 0 | Status: SHIPPED | OUTPATIENT
Start: 2022-09-30

## 2022-09-30 NOTE — TELEPHONE ENCOUNTER
Cornelius called from [facility] regarding the Phenergan rx needing a frequency on the sig. Please advise.

## 2022-11-21 ENCOUNTER — COMMUNITY OUTREACH (OUTPATIENT)
Dept: FAMILY MEDICINE CLINIC | Age: 67
End: 2022-11-21

## 2022-12-13 ENCOUNTER — TELEPHONE (OUTPATIENT)
Dept: FAMILY MEDICINE CLINIC | Age: 67
End: 2022-12-13

## 2022-12-13 NOTE — TELEPHONE ENCOUNTER
Naresh Cheney reports that she has whole body aches and a runny nose. Patient has taken Tylenol one pill every 6 hours. Is it ok to take Tylenol every 6 hours? Patient is concerned because she has one kidney. Please advise what is safe medication patient can take. Also, she has taken two Covid tests, one was negative and one had slight pink line. She thinks it is an error.

## 2022-12-16 ENCOUNTER — OFFICE VISIT (OUTPATIENT)
Dept: FAMILY MEDICINE CLINIC | Age: 67
End: 2022-12-16
Payer: MEDICARE

## 2022-12-16 ENCOUNTER — TELEPHONE (OUTPATIENT)
Dept: FAMILY MEDICINE CLINIC | Age: 67
End: 2022-12-16

## 2022-12-16 VITALS
SYSTOLIC BLOOD PRESSURE: 130 MMHG | BODY MASS INDEX: 28.12 KG/M2 | DIASTOLIC BLOOD PRESSURE: 84 MMHG | OXYGEN SATURATION: 99 % | TEMPERATURE: 98.1 F | HEART RATE: 113 BPM | WEIGHT: 144 LBS | RESPIRATION RATE: 16 BRPM

## 2022-12-16 DIAGNOSIS — B34.9 VIRAL ILLNESS: Primary | ICD-10-CM

## 2022-12-16 DIAGNOSIS — R19.7 DIARRHEA, UNSPECIFIED TYPE: ICD-10-CM

## 2022-12-16 DIAGNOSIS — R11.0 NAUSEA: ICD-10-CM

## 2022-12-16 PROCEDURE — G8400 PT W/DXA NO RESULTS DOC: HCPCS | Performed by: NURSE PRACTITIONER

## 2022-12-16 PROCEDURE — 1123F ACP DISCUSS/DSCN MKR DOCD: CPT | Performed by: NURSE PRACTITIONER

## 2022-12-16 PROCEDURE — G8427 DOCREV CUR MEDS BY ELIG CLIN: HCPCS | Performed by: NURSE PRACTITIONER

## 2022-12-16 PROCEDURE — 99213 OFFICE O/P EST LOW 20 MIN: CPT | Performed by: NURSE PRACTITIONER

## 2022-12-16 PROCEDURE — 1090F PRES/ABSN URINE INCON ASSESS: CPT | Performed by: NURSE PRACTITIONER

## 2022-12-16 PROCEDURE — 3017F COLORECTAL CA SCREEN DOC REV: CPT | Performed by: NURSE PRACTITIONER

## 2022-12-16 PROCEDURE — G8417 CALC BMI ABV UP PARAM F/U: HCPCS | Performed by: NURSE PRACTITIONER

## 2022-12-16 PROCEDURE — 3074F SYST BP LT 130 MM HG: CPT | Performed by: NURSE PRACTITIONER

## 2022-12-16 PROCEDURE — 3078F DIAST BP <80 MM HG: CPT | Performed by: NURSE PRACTITIONER

## 2022-12-16 PROCEDURE — G8484 FLU IMMUNIZE NO ADMIN: HCPCS | Performed by: NURSE PRACTITIONER

## 2022-12-16 PROCEDURE — 1036F TOBACCO NON-USER: CPT | Performed by: NURSE PRACTITIONER

## 2022-12-16 PROCEDURE — 87804 INFLUENZA ASSAY W/OPTIC: CPT | Performed by: NURSE PRACTITIONER

## 2022-12-16 ASSESSMENT — PATIENT HEALTH QUESTIONNAIRE - PHQ9
2. FEELING DOWN, DEPRESSED OR HOPELESS: 0
SUM OF ALL RESPONSES TO PHQ QUESTIONS 1-9: 0
DEPRESSION UNABLE TO ASSESS: FUNCTIONAL CAPACITY MOTIVATION LIMITS ACCURACY
SUM OF ALL RESPONSES TO PHQ9 QUESTIONS 1 & 2: 0
SUM OF ALL RESPONSES TO PHQ QUESTIONS 1-9: 0
SUM OF ALL RESPONSES TO PHQ QUESTIONS 1-9: 0
1. LITTLE INTEREST OR PLEASURE IN DOING THINGS: 0
SUM OF ALL RESPONSES TO PHQ QUESTIONS 1-9: 0

## 2022-12-16 ASSESSMENT — ANXIETY QUESTIONNAIRES
2. NOT BEING ABLE TO STOP OR CONTROL WORRYING: 0
6. BECOMING EASILY ANNOYED OR IRRITABLE: 0
1. FEELING NERVOUS, ANXIOUS, OR ON EDGE: 0
3. WORRYING TOO MUCH ABOUT DIFFERENT THINGS: 0
4. TROUBLE RELAXING: 0
GAD7 TOTAL SCORE: 0
7. FEELING AFRAID AS IF SOMETHING AWFUL MIGHT HAPPEN: 0
IF YOU CHECKED OFF ANY PROBLEMS ON THIS QUESTIONNAIRE, HOW DIFFICULT HAVE THESE PROBLEMS MADE IT FOR YOU TO DO YOUR WORK, TAKE CARE OF THINGS AT HOME, OR GET ALONG WITH OTHER PEOPLE: NOT DIFFICULT AT ALL
5. BEING SO RESTLESS THAT IT IS HARD TO SIT STILL: 0

## 2022-12-16 ASSESSMENT — ENCOUNTER SYMPTOMS
VOMITING: 0
SHORTNESS OF BREATH: 0
NAUSEA: 1
COUGH: 0
DIARRHEA: 1

## 2022-12-16 NOTE — PROGRESS NOTES
2022     Chief Complaint   Patient presents with    Chills     Body aches , Chills     Extremity Weakness    Diarrhea     Henrry Laureano (:  1955) is a 69 y.o. female, here for evaluation of the following medical concerns:    HPI    Symptoms started on 2022- fatigue, body aches, chills, diarrhea and nausea. Chills and body aches have improved. Body feels weak. Continues to have nausea and diarrhea slightly. No recent fever. Denies vomiting. Has been taking promethazine which does help with nausea. Denies cough or shortness of breath. Review of Systems   Constitutional:  Positive for activity change, appetite change, chills, fatigue and fever. Negative for diaphoresis. Respiratory:  Negative for cough and shortness of breath. Cardiovascular:  Negative for chest pain and leg swelling. Gastrointestinal:  Positive for diarrhea and nausea. Negative for vomiting. Neurological:  Negative for dizziness and headaches. All other systems reviewed and are negative. Prior to Visit Medications    Medication Sig Taking? Authorizing Provider   promethazine (PHENERGAN) 12.5 MG tablet Take 1 tablet by mouth every 72 hours as needed for Nausea Use sparingly Yes Uvaldo Harkins MD   amLODIPine (NORVASC) 5 MG tablet TAKE ONE TABLET BY MOUTH TWICE A DAY Yes Jessika Leon DO   Magnesium 400 MG TABS Take 400 mg by mouth daily Yes Historical Provider, MD   VITAMIN A PO Take 25,000 Units by mouth daily Yes Historical Provider, MD   zinc 50 MG TABS tablet Take 50 mg by mouth daily Yes Historical Provider, MD   Cholecalciferol (VITAMIN D3) 125 MCG (5000 UT) TABS Take by mouth Yes Historical Provider, MD   potassium chloride (KLOR-CON) 10 MEQ extended release tablet  Yes Historical Provider, MD   Ascorbic Acid ER 1000 MG TBCR Take  by mouth 2 times daily.    Yes Historical Provider, MD   sertraline (ZOLOFT) 25 MG tablet 1/2 tablet per day  Jessika Leon DO        Social History     Tobacco Use Smoking status: Former     Packs/day: 0.50     Years: 10.00     Pack years: 5.00     Types: Cigarettes     Quit date: 2008     Years since quittin.9    Smokeless tobacco: Never    Tobacco comments:     quit [id number]   Substance Use Topics    Alcohol use: Not Currently     Comment: occ        Vitals:    22 1510   BP: 130/84   Site: Left Upper Arm   Position: Sitting   Pulse: (!) 113   Resp: 16   Temp: 98.1 °F (36.7 °C)   TempSrc: Temporal   SpO2: 99%   Weight: 144 lb (65.3 kg)     Estimated body mass index is 28.12 kg/m² as calculated from the following:    Height as of 22: 5' (1.524 m). Weight as of this encounter: 144 lb (65.3 kg). Physical Exam  Vitals and nursing note reviewed. Constitutional:       General: She is not in acute distress. Appearance: Normal appearance. She is well-developed. She is not ill-appearing, toxic-appearing or diaphoretic. HENT:      Head: Normocephalic and atraumatic. Eyes:      Extraocular Movements: Extraocular movements intact. Pupils: Pupils are equal, round, and reactive to light. Cardiovascular:      Rate and Rhythm: Normal rate and regular rhythm. Heart sounds: Normal heart sounds, S1 normal and S2 normal. No murmur heard. No friction rub. No gallop. Pulmonary:      Effort: Pulmonary effort is normal. No respiratory distress. Breath sounds: Normal breath sounds. No stridor. No wheezing, rhonchi or rales. Abdominal:      General: Bowel sounds are normal. There is no distension. Palpations: Abdomen is soft. There is no mass. Tenderness: There is no abdominal tenderness. There is no guarding. Hernia: No hernia is present. Neurological:      General: No focal deficit present. Mental Status: She is alert and oriented to person, place, and time. Mental status is at baseline. Cranial Nerves: No cranial nerve deficit. Psychiatric:         Speech: Speech normal.       ASSESSMENT/PLAN:  1.  Viral illness    2. Nausea  - POCT Influenza A/B Antigen  - COVID-19; Future  - COVID-19    3. Diarrhea, unspecified type    Rapid flu negative  Will send COVID PCR  Discussed symptomatic treatment- fluids, rest, Tylenol PRN, bland diet    Return if symptoms worsen or fail to improve. An electronic signature was used to authenticate this note.     --Katey Smith, LEELEE - CNP on 12/16/2022 at 3:46 PM

## 2022-12-16 NOTE — TELEPHONE ENCOUNTER
Started with body aches Monday that are now gone. Legs feel very weak, nausea, is trying to push fluids, no appetite, no fever, no cough, is very tearful and feeling very anxious.  Scheduled   Future Appointments   Date Time Provider Vivienne Vann   12/16/2022  3:20 PM Enma Tabares, APRN - CNP [place] [id number] [address] [facility]

## 2022-12-16 NOTE — PATIENT INSTRUCTIONS
- monitor symptoms  - hydrate well  - Tylenol as needed for aches and pain  - Phenergan as needed for nausea is OK to continue   - symptoms should gradually improve over the next several days  - call for any new or worsening of symptoms

## 2022-12-20 NOTE — TELEPHONE ENCOUNTER
Cherise Rocha informed that she is still having nausea and has vomited this morning. She has felt sick every morning. Patient has been drinking some water and tried to eat soup. Cherise Rocha feels really sick from [place]. She would like to try paxlovid but is worried about side effects. Also patient is out of phenergan and would like refill. Patient had an appointment on 12/16/22 with Eli Ascencio.      [facility]

## 2022-12-20 NOTE — TELEPHONE ENCOUNTER
Yes, she is out of 5 day window of symptoms onset. Let her know I recommend continued symptomatic treatment. Will refill the promethazine.

## 2022-12-20 NOTE — TELEPHONE ENCOUNTER
I think patient is past the five day window for paxlovid? ? Looks like symptoms started on 12/12/22.

## 2023-03-13 NOTE — PROGRESS NOTES
oil-omega-3 fatty acids 1000 MG capsule Take 2 g by mouth daily. Yes Historical Provider, MD      Social History     Tobacco Use    Smoking status: Former Smoker     Packs/day: 0.50     Years: 10.00     Pack years: 5.00     Types: Cigarettes     Quit date: 2008     Years since quittin.2    Smokeless tobacco: Never Used    Tobacco comment: quit [id number]   Substance Use Topics    Alcohol use: Yes     Comment: occ        Vitals:    21 1002   BP: 122/64   Site: Left Upper Arm   Position: Sitting   Cuff Size: Medium Adult   Pulse: 118   Temp: 97.3 °F (36.3 °C)   SpO2: 97%   Weight: 105 lb (47.6 kg)   Height: 5' (1.524 m)     Estimated body mass index is 20.51 kg/m² as calculated from the following:    Height as of this encounter: 5' (1.524 m). Weight as of this encounter: 105 lb (47.6 kg). Physical Exam  Vitals signs and nursing note reviewed. Constitutional:       General: She is not in acute distress. Appearance: Normal appearance. She is well-developed. She is not ill-appearing, toxic-appearing or diaphoretic. HENT:      Head: Normocephalic and atraumatic. Eyes:      Extraocular Movements: Extraocular movements intact. Conjunctiva/sclera: Conjunctivae normal.      Pupils: Pupils are equal, round, and reactive to light. Neck:      Musculoskeletal: Full passive range of motion without pain. Thyroid: No thyromegaly. Cardiovascular:      Rate and Rhythm: Regular rhythm. Tachycardia present. Heart sounds: Normal heart sounds, S1 normal and S2 normal. No murmur. No friction rub. No gallop. Pulmonary:      Effort: Pulmonary effort is normal. No respiratory distress. Breath sounds: Normal breath sounds. No stridor. No wheezing, rhonchi or rales. Skin:     Nails: There is no clubbing. Neurological:      General: No focal deficit present. Mental Status: She is alert and oriented to person, place, and time. Mental status is at baseline.       Cranial Nerves: No cranial nerve deficit. Psychiatric:         Mood and Affect: Mood is anxious. Affect is tearful. Speech: Speech normal.       Results for POC orders placed in visit on 04/12/21   POCT Urinalysis No Micro (Auto)   Result Value Ref Range    Color, UA      Clarity, UA      Glucose, UA POC neg     Bilirubin, UA small     Ketones, UA trace     Spec Grav, UA 1.025     Blood, UA POC large     pH, UA 8.0     Protein, UA      Urobilinogen, UA 0.2     Leukocytes, UA neg     Nitrite, UA neg    POCT glycosylated hemoglobin (Hb A1C)   Result Value Ref Range    Hemoglobin A1C 6.2 %       ASSESSMENT/PLAN:  1. Weakness  - CBC  - Comprehensive Metabolic Panel  - TSH without Reflex  - Hepatitis C Antibody  - POCT Urinalysis No Micro (Auto)  - Culture, Urine    2. Light headed  - CBC  - Comprehensive Metabolic Panel  - TSH without Reflex  - Hepatitis C Antibody  - POCT Urinalysis No Micro (Auto)  - Culture, Urine    3. Non-intractable vomiting with nausea, unspecified vomiting type  - CBC  - Comprehensive Metabolic Panel  - TSH without Reflex  - Hepatitis C Antibody    4. Impaired glucose tolerance  - POCT glycosylated hemoglobin (Hb A1C)    5. Essential hypertension  - CBC  - Comprehensive Metabolic Panel  - TSH without Reflex  - Hepatitis C Antibody    6. Hypokalemia    7. Prediabetes  - POCT glycosylated hemoglobin (Hb A1C)    8. Positive depression screening  - Positive Screen for Clinical Depression with a Documented Follow-up Plan       Urinalysis- large blood today. No irritative voiding symptoms. Will send urine culture and recheck in one week. Check CBC, CMP, TSH  Anxiety seems to be playing a role, very anxious today. Could consider Vistaril nightly as needed to help with insomnia as well. An electronic signature was used to authenticate this note.     --Alondra Mauro, APRN - CNP on 4/12/2021 at 2:38 PM Trilobed Flap Text: The defect edges were debeveled with a #15 scalpel blade.  Given the location of the defect and the proximity to free margins a trilobed flap was deemed most appropriate.  Using a sterile surgical marker, an appropriate trilobed flap drawn around the defect.    The area thus outlined was incised deep to adipose tissue with a #15 scalpel blade.  The skin margins were undermined to an appropriate distance in all directions utilizing iris scissors.

## 2023-06-06 ENCOUNTER — TELEMEDICINE (OUTPATIENT)
Dept: FAMILY MEDICINE CLINIC | Age: 68
End: 2023-06-06
Payer: MEDICARE

## 2023-06-06 DIAGNOSIS — I10 ESSENTIAL HYPERTENSION: ICD-10-CM

## 2023-06-06 DIAGNOSIS — Z00.00 MEDICARE ANNUAL WELLNESS VISIT, SUBSEQUENT: ICD-10-CM

## 2023-06-06 DIAGNOSIS — Z00.00 INITIAL MEDICARE ANNUAL WELLNESS VISIT: Primary | ICD-10-CM

## 2023-06-06 PROCEDURE — G0438 PPPS, INITIAL VISIT: HCPCS | Performed by: FAMILY MEDICINE

## 2023-06-06 PROCEDURE — 3017F COLORECTAL CA SCREEN DOC REV: CPT | Performed by: FAMILY MEDICINE

## 2023-06-06 PROCEDURE — 1123F ACP DISCUSS/DSCN MKR DOCD: CPT | Performed by: FAMILY MEDICINE

## 2023-06-06 RX ORDER — AMLODIPINE BESYLATE 5 MG/1
5 TABLET ORAL 2 TIMES DAILY
Qty: 60 TABLET | Refills: 11 | Status: SHIPPED | OUTPATIENT
Start: 2023-06-06

## 2023-06-06 ASSESSMENT — PATIENT HEALTH QUESTIONNAIRE - PHQ9
1. LITTLE INTEREST OR PLEASURE IN DOING THINGS: 0
SUM OF ALL RESPONSES TO PHQ QUESTIONS 1-9: 0
SUM OF ALL RESPONSES TO PHQ QUESTIONS 1-9: 0
SUM OF ALL RESPONSES TO PHQ9 QUESTIONS 1 & 2: 0
SUM OF ALL RESPONSES TO PHQ QUESTIONS 1-9: 0
SUM OF ALL RESPONSES TO PHQ QUESTIONS 1-9: 0
2. FEELING DOWN, DEPRESSED OR HOPELESS: 0
SUM OF ALL RESPONSES TO PHQ QUESTIONS 1-9: 0
2. FEELING DOWN, DEPRESSED OR HOPELESS: 0
SUM OF ALL RESPONSES TO PHQ9 QUESTIONS 1 & 2: 0
1. LITTLE INTEREST OR PLEASURE IN DOING THINGS: 0
SUM OF ALL RESPONSES TO PHQ QUESTIONS 1-9: 0

## 2023-06-06 ASSESSMENT — LIFESTYLE VARIABLES
HOW MANY STANDARD DRINKS CONTAINING ALCOHOL DO YOU HAVE ON A TYPICAL DAY: 1 OR 2
HOW OFTEN DO YOU HAVE A DRINK CONTAINING ALCOHOL: 2-4 TIMES A MONTH

## 2023-06-06 NOTE — PATIENT INSTRUCTIONS
cook. Bake, broil, and steam foods instead of frying them.     Eat a variety of fruit and vegetables every day. Dark green, deep orange, red, or yellow fruits and vegetables are especially good for you. Examples include spinach, carrots, peaches, and berries.     Foods high in fiber can reduce your cholesterol and provide important vitamins and minerals. High-fiber foods include whole-grain cereals and breads, oatmeal, beans, brown rice, citrus fruits, and apples.     Eat lean proteins. Heart-healthy proteins include seafood, lean meats and poultry, eggs, beans, peas, nuts, seeds, and soy products.     Limit drinks and foods with added sugar. These include candy, desserts, and soda pop. Lifestyle changes    If your doctor recommends it, get more exercise. Walking is a good choice. Bit by bit, increase the amount you walk every day. Try for at least 30 minutes on most days of the week. You also may want to swim, bike, or do other activities.     Do not smoke. If you need help quitting, talk to your doctor about stop-smoking programs and medicines. These can increase your chances of quitting for good. Quitting smoking may be the most important step you can take to protect your heart. It is never too late to quit.     Limit alcohol to 2 drinks a day for men and 1 drink a day for women. Too much alcohol can cause health problems.     Manage other health problems such as diabetes, high blood pressure, and high cholesterol. If you think you may have a problem with alcohol or drug use, talk to your doctor. Medicines    Take your medicines exactly as prescribed. Call your doctor if you think you are having a problem with your medicine.     If your doctor recommends aspirin, take the amount directed each day. Make sure you take aspirin and not another kind of pain reliever, such as acetaminophen (Tylenol). When should you call for help? Call 911 if you have symptoms of a heart attack.  These may include:    Chest pain

## 2023-06-06 NOTE — PROGRESS NOTES
Medicare Annual Wellness Visit    Jenelle Rodrigues is here for Medicare AWV    Assessment & Plan   Medicare annual wellness visit, subsequent  Essential hypertension  -     amLODIPine (NORVASC) 5 MG tablet; Take 1 tablet by mouth in the morning and at bedtime, Disp-60 tablet, R-11Normal    Recommendations for Preventive Services Due: see orders and patient instructions/AVS.  Recommended screening schedule for the next 5-10 years is provided to the patient in written form: see Patient Instructions/AVS.     No follow-ups on file. Subjective     Patient's complete Health Risk Assessment and screening values have been reviewed and are found in Flowsheets. The following problems were reviewed today and where indicated follow up appointments were made and/or referrals ordered. Positive Risk Factor Screenings with Interventions:       Cognitive: Words recalled: 2 Words Recalled           Total Score Interpretation: Abnormal Mini-Cog      Interventions:  Information given in the after visit summary                  Vision Screen:  Do you have difficulty driving, watching TV, or doing any of your daily activities because of your eyesight?: No  Have you had an eye exam within the past year?: (!) No  No results found. Interventions:   Information given in the after visit summary      Safety:  Do you have any tripping hazards - loose or unsecured carpets or rugs?: (!) Yes  Do you have any tripping hazards - clutter in doorways, halls, or stairs?: (!) Yes  Interventions:  Information given in the after visit summary       Advanced Directives:  Do you have a Living Will?: (!) No    Intervention:  Information given in the after visit summary                  Objective      Active, full time job. Has stress at home, family stress.      Patient-Reported Vitals  Patient-Reported Systolic (Top): 032 mmHg  Patient-Reported Diastolic (Bottom): 79 mmHg  BP Observations: Yes, BP was taken on electronic monitoring device

## 2024-01-03 ENCOUNTER — COMMUNITY OUTREACH (OUTPATIENT)
Dept: FAMILY MEDICINE CLINIC | Age: 69
End: 2024-01-03

## 2024-01-26 NOTE — PROGRESS NOTES
Patient's HM shows they are overdue for Mammogram Screening. [employer] and  files searched. No results to attach to order nor HM updated.
This is a surgical and/or non-medical patient.

## 2024-07-30 ENCOUNTER — OFFICE VISIT (OUTPATIENT)
Dept: FAMILY MEDICINE CLINIC | Age: 69
End: 2024-07-30
Payer: MEDICARE

## 2024-07-30 VITALS
BODY MASS INDEX: 24.35 KG/M2 | SYSTOLIC BLOOD PRESSURE: 140 MMHG | HEIGHT: 60 IN | TEMPERATURE: 97.2 F | DIASTOLIC BLOOD PRESSURE: 70 MMHG | OXYGEN SATURATION: 98 % | WEIGHT: 124 LBS | HEART RATE: 118 BPM

## 2024-07-30 DIAGNOSIS — D75.839 THROMBOCYTOSIS: ICD-10-CM

## 2024-07-30 DIAGNOSIS — R00.0 TACHYCARDIA: ICD-10-CM

## 2024-07-30 DIAGNOSIS — F43.0 ACUTE STRESS DISORDER: ICD-10-CM

## 2024-07-30 DIAGNOSIS — E11.69 TYPE 2 DIABETES MELLITUS WITH OTHER SPECIFIED COMPLICATION, WITHOUT LONG-TERM CURRENT USE OF INSULIN (HCC): ICD-10-CM

## 2024-07-30 DIAGNOSIS — F41.9 ANXIETY: ICD-10-CM

## 2024-07-30 DIAGNOSIS — I10 ESSENTIAL HYPERTENSION: ICD-10-CM

## 2024-07-30 DIAGNOSIS — D75.839 THROMBOCYTOSIS: Primary | ICD-10-CM

## 2024-07-30 LAB
ALBUMIN SERPL-MCNC: 4.2 G/DL (ref 3.4–5)
ALP SERPL-CCNC: 292 U/L (ref 40–129)
ALT SERPL-CCNC: 52 U/L (ref 10–40)
AST SERPL-CCNC: 34 U/L (ref 15–37)
BASOPHILS # BLD: 0.1 K/UL (ref 0–0.2)
BASOPHILS NFR BLD: 0.8 %
BILIRUB DIRECT SERPL-MCNC: <0.2 MG/DL (ref 0–0.3)
BILIRUB INDIRECT SERPL-MCNC: ABNORMAL MG/DL (ref 0–1)
BILIRUB SERPL-MCNC: 0.3 MG/DL (ref 0–1)
DEPRECATED RDW RBC AUTO: 15.9 % (ref 12.4–15.4)
EOSINOPHIL # BLD: 0 K/UL (ref 0–0.6)
EOSINOPHIL NFR BLD: 0.1 %
FERRITIN SERPL IA-MCNC: 171.2 NG/ML (ref 15–150)
HCT VFR BLD AUTO: 41.3 % (ref 36–48)
HGB BLD-MCNC: 13.8 G/DL (ref 12–16)
IRON SATN MFR SERPL: 16 % (ref 15–50)
IRON SERPL-MCNC: 62 UG/DL (ref 37–145)
LYMPHOCYTES # BLD: 1.5 K/UL (ref 1–5.1)
LYMPHOCYTES NFR BLD: 15.2 %
MCH RBC QN AUTO: 27.5 PG (ref 26–34)
MCHC RBC AUTO-ENTMCNC: 33.5 G/DL (ref 31–36)
MCV RBC AUTO: 82.2 FL (ref 80–100)
MONOCYTES # BLD: 0.5 K/UL (ref 0–1.3)
MONOCYTES NFR BLD: 5.4 %
NEUTROPHILS # BLD: 7.8 K/UL (ref 1.7–7.7)
NEUTROPHILS NFR BLD: 78.5 %
PLATELET # BLD AUTO: 622 K/UL (ref 135–450)
PMV BLD AUTO: 8 FL (ref 5–10.5)
PROT SERPL-MCNC: 8.3 G/DL (ref 6.4–8.2)
RBC # BLD AUTO: 5.02 M/UL (ref 4–5.2)
TIBC SERPL-MCNC: 387 UG/DL (ref 260–445)
TSH SERPL DL<=0.005 MIU/L-ACNC: 0.78 UIU/ML (ref 0.27–4.2)
VIT B12 SERPL-MCNC: 814 PG/ML (ref 211–911)
WBC # BLD AUTO: 9.9 K/UL (ref 4–11)

## 2024-07-30 PROCEDURE — 1090F PRES/ABSN URINE INCON ASSESS: CPT | Performed by: STUDENT IN AN ORGANIZED HEALTH CARE EDUCATION/TRAINING PROGRAM

## 2024-07-30 PROCEDURE — 2022F DILAT RTA XM EVC RTNOPTHY: CPT | Performed by: STUDENT IN AN ORGANIZED HEALTH CARE EDUCATION/TRAINING PROGRAM

## 2024-07-30 PROCEDURE — 1036F TOBACCO NON-USER: CPT | Performed by: STUDENT IN AN ORGANIZED HEALTH CARE EDUCATION/TRAINING PROGRAM

## 2024-07-30 PROCEDURE — 3017F COLORECTAL CA SCREEN DOC REV: CPT | Performed by: STUDENT IN AN ORGANIZED HEALTH CARE EDUCATION/TRAINING PROGRAM

## 2024-07-30 PROCEDURE — G8400 PT W/DXA NO RESULTS DOC: HCPCS | Performed by: STUDENT IN AN ORGANIZED HEALTH CARE EDUCATION/TRAINING PROGRAM

## 2024-07-30 PROCEDURE — G8420 CALC BMI NORM PARAMETERS: HCPCS | Performed by: STUDENT IN AN ORGANIZED HEALTH CARE EDUCATION/TRAINING PROGRAM

## 2024-07-30 PROCEDURE — 1123F ACP DISCUSS/DSCN MKR DOCD: CPT | Performed by: STUDENT IN AN ORGANIZED HEALTH CARE EDUCATION/TRAINING PROGRAM

## 2024-07-30 PROCEDURE — 3051F HG A1C>EQUAL 7.0%<8.0%: CPT | Performed by: STUDENT IN AN ORGANIZED HEALTH CARE EDUCATION/TRAINING PROGRAM

## 2024-07-30 PROCEDURE — 3077F SYST BP >= 140 MM HG: CPT | Performed by: STUDENT IN AN ORGANIZED HEALTH CARE EDUCATION/TRAINING PROGRAM

## 2024-07-30 PROCEDURE — 3078F DIAST BP <80 MM HG: CPT | Performed by: STUDENT IN AN ORGANIZED HEALTH CARE EDUCATION/TRAINING PROGRAM

## 2024-07-30 PROCEDURE — G8427 DOCREV CUR MEDS BY ELIG CLIN: HCPCS | Performed by: STUDENT IN AN ORGANIZED HEALTH CARE EDUCATION/TRAINING PROGRAM

## 2024-07-30 RX ORDER — METOPROLOL SUCCINATE 25 MG/1
12.5 TABLET, EXTENDED RELEASE ORAL DAILY
Qty: 45 TABLET | Refills: 1 | Status: SHIPPED | OUTPATIENT
Start: 2024-07-30

## 2024-07-30 RX ORDER — AMLODIPINE BESYLATE 2.5 MG/1
2.5 TABLET ORAL 2 TIMES DAILY
Qty: 180 TABLET | Refills: 0 | Status: SHIPPED | OUTPATIENT
Start: 2024-07-30

## 2024-07-30 SDOH — ECONOMIC STABILITY: INCOME INSECURITY: HOW HARD IS IT FOR YOU TO PAY FOR THE VERY BASICS LIKE FOOD, HOUSING, MEDICAL CARE, AND HEATING?: NOT VERY HARD

## 2024-07-30 SDOH — ECONOMIC STABILITY: FOOD INSECURITY: WITHIN THE PAST 12 MONTHS, YOU WORRIED THAT YOUR FOOD WOULD RUN OUT BEFORE YOU GOT MONEY TO BUY MORE.: NEVER TRUE

## 2024-07-30 SDOH — ECONOMIC STABILITY: FOOD INSECURITY: WITHIN THE PAST 12 MONTHS, THE FOOD YOU BOUGHT JUST DIDN'T LAST AND YOU DIDN'T HAVE MONEY TO GET MORE.: NEVER TRUE

## 2024-07-30 SDOH — ECONOMIC STABILITY: HOUSING INSECURITY
IN THE LAST 12 MONTHS, WAS THERE A TIME WHEN YOU DID NOT HAVE A STEADY PLACE TO SLEEP OR SLEPT IN A SHELTER (INCLUDING NOW)?: NO

## 2024-07-30 ASSESSMENT — PATIENT HEALTH QUESTIONNAIRE - PHQ9
SUM OF ALL RESPONSES TO PHQ QUESTIONS 1-9: 0
1. LITTLE INTEREST OR PLEASURE IN DOING THINGS: NOT AT ALL
2. FEELING DOWN, DEPRESSED OR HOPELESS: NOT AT ALL
SUM OF ALL RESPONSES TO PHQ QUESTIONS 1-9: 0
SUM OF ALL RESPONSES TO PHQ9 QUESTIONS 1 & 2: 0

## 2024-07-30 NOTE — PATIENT INSTRUCTIONS
- Metoprolol succinate 1/2 tablet at night   - decrease amlodipine 2.5 mg twice daily   - Schedule with Cardiology   - CBC in 1-2 weeks   - Call with blood pressure and heart rate 2 weeks   - Labs today

## 2024-07-30 NOTE — PROGRESS NOTES
[facility]  [address]  [address]  [place], [place] [address]  Tel: [phone]      2024   SUBJECTIVE/OBJECTIVE  HPI    Catrachita Parsons (:  1955) is a 69 y.o. female, here for evaluation of the following medical concerns:  Chief Complaint   Patient presents with    Hypertension   Patient is a 69 y.o. female  has a past medical history of Anxiety, Complication of anesthesia, Hypertension, and Kidney mass. who presents for follow-up on blood pressure.  Has one kidney   Sees Dr. Edenilson Treviño holistic doctor, last seen 5 months ago, taking Vitamin K and garlic.   ER follow-up  Patient was seen in the emergency room on 2024 [facility] with elevated blood pressures, with nausea and generalized weakness.  Denies any chest pain, shortness of breath, lightheadedness, dizziness, vomiting, abdominal pain, nausea, or extremity swelling.  EKG reveals sinus tachycardia, rate 117, no gross ST changes.  Labs mildly elevated troponin 1715, UA positive for blood and RBCs, [place] negative influenza negative RSV negative magnesium 2.2 lactate, PT PT/INR, normal, BMP SHOWED GFR 58 glucose 152  Platelets high at 622    Tachycardia  This is a new problem. Episode onset: 2 months. The problem occurs constantly. Associated symptoms include diaphoresis and fatigue. Pertinent negatives include no abdominal pain, anorexia, arthralgias, change in bowel habit, chest pain, congestion, coughing, fever, headaches, nausea, neck pain, numbness, sore throat, swollen glands, urinary symptoms, vomiting or weakness. She has tried nothing for the symptoms. The treatment provided no relief.  Endorses many stressors.  Daughter is addict, in rehab for drugs.  Currently patient has custody of her daughter's children :3 year old and 3 month old, has to wake to feed of baby. Father of baby not involved, and grandmother trying to get custody. Quit her job and lost weight, can't sleep due to baby. usband

## 2024-07-31 ENCOUNTER — TELEPHONE (OUTPATIENT)
Dept: FAMILY MEDICINE CLINIC | Age: 69
End: 2024-07-31

## 2024-07-31 LAB
EST. AVERAGE GLUCOSE BLD GHB EST-MCNC: 165.7 MG/DL
HBA1C MFR BLD: 7.4 %

## 2024-07-31 NOTE — TELEPHONE ENCOUNTER
Patient calling in. Very upset about labs. Requesting a call with results also scheduled an appointment for tomorrow.   Future Appointments   Date Time Provider Department Center   8/1/2024  9:00 AM Tatiana Garcia MD [facility] [place] [facility]   9/23/2024  9:30 AM Nasim Gotti DO [facility]   9/26/2024 11:20 AM Tatiana Garcia MD [facility] [place] [facility]

## 2024-08-01 ENCOUNTER — OFFICE VISIT (OUTPATIENT)
Dept: FAMILY MEDICINE CLINIC | Age: 69
End: 2024-08-01
Payer: MEDICARE

## 2024-08-01 VITALS
DIASTOLIC BLOOD PRESSURE: 88 MMHG | SYSTOLIC BLOOD PRESSURE: 137 MMHG | HEART RATE: 109 BPM | WEIGHT: 124 LBS | BODY MASS INDEX: 24.22 KG/M2 | OXYGEN SATURATION: 98 % | TEMPERATURE: 97.3 F

## 2024-08-01 DIAGNOSIS — R74.8 ALKALINE PHOSPHATASE ELEVATION: Primary | ICD-10-CM

## 2024-08-01 DIAGNOSIS — R74.8 ALKALINE PHOSPHATASE ELEVATION: ICD-10-CM

## 2024-08-01 DIAGNOSIS — E11.69 TYPE 2 DIABETES MELLITUS WITH OTHER SPECIFIED COMPLICATION, WITHOUT LONG-TERM CURRENT USE OF INSULIN (HCC): ICD-10-CM

## 2024-08-01 DIAGNOSIS — R00.0 TACHYCARDIA: ICD-10-CM

## 2024-08-01 DIAGNOSIS — Z90.5 S/P NEPHRECTOMY: ICD-10-CM

## 2024-08-01 DIAGNOSIS — F41.9 ANXIETY: ICD-10-CM

## 2024-08-01 DIAGNOSIS — I10 ESSENTIAL HYPERTENSION: ICD-10-CM

## 2024-08-01 DIAGNOSIS — R11.0 NAUSEA: ICD-10-CM

## 2024-08-01 LAB — GGT SERPL-CCNC: 191 U/L (ref 5–36)

## 2024-08-01 PROCEDURE — 99213 OFFICE O/P EST LOW 20 MIN: CPT | Performed by: STUDENT IN AN ORGANIZED HEALTH CARE EDUCATION/TRAINING PROGRAM

## 2024-08-01 PROCEDURE — G8400 PT W/DXA NO RESULTS DOC: HCPCS | Performed by: STUDENT IN AN ORGANIZED HEALTH CARE EDUCATION/TRAINING PROGRAM

## 2024-08-01 PROCEDURE — 1090F PRES/ABSN URINE INCON ASSESS: CPT | Performed by: STUDENT IN AN ORGANIZED HEALTH CARE EDUCATION/TRAINING PROGRAM

## 2024-08-01 PROCEDURE — 1036F TOBACCO NON-USER: CPT | Performed by: STUDENT IN AN ORGANIZED HEALTH CARE EDUCATION/TRAINING PROGRAM

## 2024-08-01 PROCEDURE — 3051F HG A1C>EQUAL 7.0%<8.0%: CPT | Performed by: STUDENT IN AN ORGANIZED HEALTH CARE EDUCATION/TRAINING PROGRAM

## 2024-08-01 PROCEDURE — 1123F ACP DISCUSS/DSCN MKR DOCD: CPT | Performed by: STUDENT IN AN ORGANIZED HEALTH CARE EDUCATION/TRAINING PROGRAM

## 2024-08-01 PROCEDURE — 3079F DIAST BP 80-89 MM HG: CPT | Performed by: STUDENT IN AN ORGANIZED HEALTH CARE EDUCATION/TRAINING PROGRAM

## 2024-08-01 PROCEDURE — 3017F COLORECTAL CA SCREEN DOC REV: CPT | Performed by: STUDENT IN AN ORGANIZED HEALTH CARE EDUCATION/TRAINING PROGRAM

## 2024-08-01 PROCEDURE — G8427 DOCREV CUR MEDS BY ELIG CLIN: HCPCS | Performed by: STUDENT IN AN ORGANIZED HEALTH CARE EDUCATION/TRAINING PROGRAM

## 2024-08-01 PROCEDURE — G8420 CALC BMI NORM PARAMETERS: HCPCS | Performed by: STUDENT IN AN ORGANIZED HEALTH CARE EDUCATION/TRAINING PROGRAM

## 2024-08-01 PROCEDURE — 2022F DILAT RTA XM EVC RTNOPTHY: CPT | Performed by: STUDENT IN AN ORGANIZED HEALTH CARE EDUCATION/TRAINING PROGRAM

## 2024-08-01 PROCEDURE — 3075F SYST BP GE 130 - 139MM HG: CPT | Performed by: STUDENT IN AN ORGANIZED HEALTH CARE EDUCATION/TRAINING PROGRAM

## 2024-08-01 RX ORDER — PROMETHAZINE HYDROCHLORIDE 12.5 MG/1
12.5 TABLET ORAL
Qty: 7 TABLET | Refills: 0 | Status: SHIPPED | OUTPATIENT
Start: 2024-08-01 | End: 2024-08-22

## 2024-08-01 SDOH — ECONOMIC STABILITY: INCOME INSECURITY: HOW HARD IS IT FOR YOU TO PAY FOR THE VERY BASICS LIKE FOOD, HOUSING, MEDICAL CARE, AND HEATING?: NOT VERY HARD

## 2024-08-01 SDOH — ECONOMIC STABILITY: FOOD INSECURITY: WITHIN THE PAST 12 MONTHS, YOU WORRIED THAT YOUR FOOD WOULD RUN OUT BEFORE YOU GOT MONEY TO BUY MORE.: NEVER TRUE

## 2024-08-01 SDOH — ECONOMIC STABILITY: FOOD INSECURITY: WITHIN THE PAST 12 MONTHS, THE FOOD YOU BOUGHT JUST DIDN'T LAST AND YOU DIDN'T HAVE MONEY TO GET MORE.: NEVER TRUE

## 2024-08-01 ASSESSMENT — PATIENT HEALTH QUESTIONNAIRE - PHQ9
2. FEELING DOWN, DEPRESSED OR HOPELESS: NOT AT ALL
SUM OF ALL RESPONSES TO PHQ QUESTIONS 1-9: 0
SUM OF ALL RESPONSES TO PHQ9 QUESTIONS 1 & 2: 0
SUM OF ALL RESPONSES TO PHQ QUESTIONS 1-9: 0
1. LITTLE INTEREST OR PLEASURE IN DOING THINGS: NOT AT ALL

## 2024-08-01 NOTE — PATIENT INSTRUCTIONS
Low carb diet   Schedule Ultrasound   - Metoprolol in the monring and   Amlodipine 2.5 mg AM and 5 mg at night

## 2024-08-01 NOTE — PROGRESS NOTES
[facility]  [address]  [address]  [place], [place] [address]  Tel: [phone]      2024   SUBJECTIVE/OBJECTIVE  HPI    Catrachita Parsons (:  1955) is a 69 y.o. female, here for evaluation of the following medical concerns:  Chief Complaint   Patient presents with    Discuss Labs   Patient is a 69-year-old female with history of nephrectomy, hypertension, diabetes, presents to discuss lab work.  Patient presents to discuss most recent labs  Liver panel showed alkaline phosphatase level 292, previously 103, elevated ALT of 52.  Protein also elevated 8.3.  Ferritin elevated 171.2, iron panel normal, B12 normal    Nausea this morning and anxiety. Dizziness when closing her eyes.           Some sweats in the morning  and weight loss , eating less due to stress and job changes. No Erythromelalgia flushing or pruritus.     Abnormal Lab  Associated symptoms include anorexia, fatigue (poor sleep), nausea and weakness. Pertinent negatives include no abdominal pain, change in bowel habit, chest pain, congestion, coughing, fever, headaches, sore throat, urinary symptoms or vomiting. Associated symptoms comments: No sore throat .       Diabetes: Most recent A1c is 7.4%, diet controlled.  2024    Hypertension: On metoprolol succinate 12.5 mg half tablet daily and amlodipine 2.5 mg twice daily. Has not tracked her heart rate or blood pressure.     Review of Systems   Constitutional:  Positive for fatigue (poor sleep). Negative for fever.   HENT:  Negative for congestion and sore throat.    Respiratory:  Negative for cough.    Cardiovascular:  Negative for chest pain.   Gastrointestinal:  Positive for anorexia and nausea. Negative for abdominal pain, change in bowel habit and vomiting.   Neurological:  Positive for weakness. Negative for headaches.       Prior to Visit Medications    Medication Sig Taking? Authorizing Provider   promethazine (PHENERGAN) 12.5 MG tablet Take 1 tablet

## 2024-08-08 ENCOUNTER — TELEPHONE (OUTPATIENT)
Dept: FAMILY MEDICINE CLINIC | Age: 69
End: 2024-08-08

## 2024-08-22 ENCOUNTER — OFFICE VISIT (OUTPATIENT)
Dept: FAMILY MEDICINE CLINIC | Age: 69
End: 2024-08-22
Payer: MEDICARE

## 2024-08-22 VITALS
HEART RATE: 100 BPM | HEIGHT: 60 IN | BODY MASS INDEX: 23.95 KG/M2 | OXYGEN SATURATION: 99 % | WEIGHT: 122 LBS | SYSTOLIC BLOOD PRESSURE: 152 MMHG | DIASTOLIC BLOOD PRESSURE: 80 MMHG

## 2024-08-22 DIAGNOSIS — Z09 HOSPITAL DISCHARGE FOLLOW-UP: ICD-10-CM

## 2024-08-22 DIAGNOSIS — Z76.89 ENCOUNTER TO ESTABLISH CARE: ICD-10-CM

## 2024-08-22 DIAGNOSIS — R00.0 TACHYCARDIA: ICD-10-CM

## 2024-08-22 DIAGNOSIS — I10 ESSENTIAL HYPERTENSION: Primary | ICD-10-CM

## 2024-08-22 DIAGNOSIS — R16.0 LIVER MASS, LEFT LOBE: ICD-10-CM

## 2024-08-22 PROCEDURE — 1111F DSCHRG MED/CURRENT MED MERGE: CPT | Performed by: SURGERY

## 2024-08-22 PROCEDURE — 3079F DIAST BP 80-89 MM HG: CPT | Performed by: SURGERY

## 2024-08-22 PROCEDURE — G8420 CALC BMI NORM PARAMETERS: HCPCS | Performed by: SURGERY

## 2024-08-22 PROCEDURE — 1036F TOBACCO NON-USER: CPT | Performed by: SURGERY

## 2024-08-22 PROCEDURE — G2211 COMPLEX E/M VISIT ADD ON: HCPCS | Performed by: SURGERY

## 2024-08-22 PROCEDURE — 99213 OFFICE O/P EST LOW 20 MIN: CPT | Performed by: SURGERY

## 2024-08-22 PROCEDURE — 3017F COLORECTAL CA SCREEN DOC REV: CPT | Performed by: SURGERY

## 2024-08-22 PROCEDURE — G8400 PT W/DXA NO RESULTS DOC: HCPCS | Performed by: SURGERY

## 2024-08-22 PROCEDURE — 1090F PRES/ABSN URINE INCON ASSESS: CPT | Performed by: SURGERY

## 2024-08-22 PROCEDURE — 3077F SYST BP >= 140 MM HG: CPT | Performed by: SURGERY

## 2024-08-22 PROCEDURE — 1123F ACP DISCUSS/DSCN MKR DOCD: CPT | Performed by: SURGERY

## 2024-08-22 PROCEDURE — G8427 DOCREV CUR MEDS BY ELIG CLIN: HCPCS | Performed by: SURGERY

## 2024-08-22 RX ORDER — CARVEDILOL 6.25 MG/1
6.25 TABLET ORAL 2 TIMES DAILY WITH MEALS
Qty: 60 TABLET | Refills: 2 | Status: SHIPPED | OUTPATIENT
Start: 2024-08-22

## 2024-08-22 RX ORDER — AMLODIPINE BESYLATE 5 MG/1
5 TABLET ORAL 2 TIMES DAILY
COMMUNITY

## 2024-08-22 RX ORDER — CARVEDILOL 6.25 MG/1
6.25 TABLET ORAL 2 TIMES DAILY WITH MEALS
COMMUNITY
Start: 2024-08-05 | End: 2024-08-22 | Stop reason: SDUPTHER

## 2024-08-22 NOTE — PROGRESS NOTES
This interrupted sleep has made big impact on her.     Patient Active Problem List   Diagnosis    Essential hypertension    Prediabetes    Anxiety    Microscopic hematuria    Mood disorder (HCC)    Panic disorder    S/p nephrectomy    Paraganglioma (HCC)       Medications listed as ordered at the time of discharge from hospital     Medication List            Accurate as of August 22, 2024  9:15 AM. If you have any questions, ask your nurse or doctor.                CONTINUE taking these medications      amLODIPine 5 MG tablet  Commonly known as: NORVASC     Ascorbic Acid ER 1000 MG Tbcr     carvedilol 6.25 MG tablet  Commonly known as: COREG  Take 1 tablet by mouth 2 times daily (with meals)     Magnesium 400 MG Tabs     potassium chloride 10 MEQ extended release tablet  Commonly known as: KLOR-CON     Vitamin D3 125 MCG (5000 UT) Tabs     zinc 50 MG Tabs tablet            STOP taking these medications      metoprolol succinate 25 MG extended release tablet  Commonly known as: TOPROL XL  Stopped by: Dr. Tati Root DO     promethazine 12.5 MG tablet  Commonly known as: PHENERGAN  Stopped by: Dr. Tati Root DO               Where to Get Your Medications        These medications were sent to [facility] [id number] - [facility] [address] - P [phone] - F [phone]  [address] [facility] [address]      Phone: [phone]   carvedilol 6.25 MG tablet           Medications marked \"taking\" at this time  Outpatient Medications Marked as Taking for the 8/22/24 encounter (Office Visit) with Tati Root DO   Medication Sig Dispense Refill    carvedilol (COREG) 6.25 MG tablet Take 1 tablet by mouth 2 times daily (with meals) 60 tablet 2    amLODIPine (NORVASC) 5 MG tablet Take 1 tablet by mouth in the morning and at bedtime      Magnesium 400 MG TABS Take 400 mg by mouth daily      zinc 50 MG TABS tablet Take 1 tablet by mouth daily      Cholecalciferol (VITAMIN D3) 125 MCG

## 2024-10-02 ENCOUNTER — TELEPHONE (OUTPATIENT)
Dept: FAMILY MEDICINE CLINIC | Age: 69
End: 2024-10-02

## 2024-10-02 NOTE — TELEPHONE ENCOUNTER
Evelyn is asking for home care orders for this patient . She has appointment to est with her in november

## 2024-10-09 NOTE — TELEPHONE ENCOUNTER
Sharon called back to check status on message.  Will you be signing home care orders for this patient

## 2024-11-12 ENCOUNTER — OFFICE VISIT (OUTPATIENT)
Dept: FAMILY MEDICINE CLINIC | Age: 69
End: 2024-11-12

## 2024-11-12 VITALS
SYSTOLIC BLOOD PRESSURE: 122 MMHG | WEIGHT: 112 LBS | BODY MASS INDEX: 21.99 KG/M2 | DIASTOLIC BLOOD PRESSURE: 88 MMHG | OXYGEN SATURATION: 96 % | TEMPERATURE: 97.8 F | HEIGHT: 60 IN | HEART RATE: 121 BPM

## 2024-11-12 DIAGNOSIS — Z01.818 PREOP EXAMINATION: Primary | ICD-10-CM

## 2024-11-12 RX ORDER — PROMETHAZINE HYDROCHLORIDE 12.5 MG/1
12.5 TABLET ORAL EVERY 8 HOURS PRN
COMMUNITY
Start: 2024-10-24 | End: 2024-11-23

## 2024-11-12 RX ORDER — HYDROXYZINE HYDROCHLORIDE 25 MG/1
25 TABLET, FILM COATED ORAL NIGHTLY PRN
COMMUNITY
Start: 2024-10-11

## 2024-11-12 RX ORDER — ONDANSETRON 4 MG/1
4 TABLET, FILM COATED ORAL EVERY 6 HOURS PRN
COMMUNITY
Start: 2024-10-24 | End: 2024-11-23

## 2024-11-12 RX ORDER — PANTOPRAZOLE SODIUM 20 MG/1
20 TABLET, DELAYED RELEASE ORAL DAILY
COMMUNITY
Start: 2024-10-07

## 2024-11-12 RX ORDER — AMOXICILLIN 250 MG
1-2 CAPSULE ORAL NIGHTLY
COMMUNITY
Start: 2024-10-24 | End: 2024-11-23

## 2024-11-12 ASSESSMENT — ENCOUNTER SYMPTOMS
CONSTIPATION: 1
COUGH: 0
ABDOMINAL PAIN: 1
SHORTNESS OF BREATH: 0
SORE THROAT: 0
DIARRHEA: 0

## 2024-11-12 NOTE — PROGRESS NOTES
with Differential:    Lab Results   Component Value Date/Time    WBC 11.0 10/31/2024 04:09 PM    RBC 4.76 10/31/2024 04:09 PM    HGB 12.7 10/31/2024 04:09 PM    HCT 37.3 10/31/2024 04:09 PM     10/31/2024 04:09 PM    MCV 78.3 10/31/2024 04:09 PM    MCH 26.7 10/31/2024 04:09 PM    MCHC 34.0 10/31/2024 04:09 PM    RDW 16.3 10/31/2024 04:09 PM    LYMPHOPCT 28 10/31/2024 04:09 PM    MONOPCT 6 10/31/2024 04:09 PM    EOSPCT 0 10/31/2024 04:09 PM    BASOPCT 0.8 07/30/2024 11:57 AM    MONOSABS 0.70 10/31/2024 04:09 PM    LYMPHSABS 3.10 10/31/2024 04:09 PM    EOSABS 0.00 10/31/2024 04:09 PM    BASOSABS 0.10 10/31/2024 04:09 PM    BASOSABS 1 10/31/2024 04:09 PM    DIFFTYPE Auto 08/05/2011 06:51 AM     CMP:    Lab Results   Component Value Date/Time     11/10/2024 09:54 AM    K 5.6 11/10/2024 09:54 AM    K 3.7 07/20/2021 05:25 AM    CL 93 11/10/2024 09:54 AM    CO2 27 11/10/2024 09:54 AM    BUN 28 11/10/2024 09:54 AM    CREATININE 1.14 11/10/2024 09:54 AM    GFRAA 60 09/01/2021 03:04 PM    AGRATIO 1.5 04/12/2021 10:36 AM    LABGLOM 49 09/01/2021 03:04 PM    GLUCOSE 145 11/10/2024 09:54 AM    GLUCOSE 90 08/24/2021 03:48 PM    CALCIUM 9.9 11/10/2024 09:54 AM    BILITOT 0.5 10/31/2024 04:09 PM    ALKPHOS 196 10/31/2024 04:09 PM    AST 23 10/31/2024 04:09 PM    ALT 27 10/31/2024 04:09 PM       ASSESSMENT AND PLAN:    1.  Patient is a 69 y.o. female with above specified procedure planned on 11/19/24 with Dr. Hyde at [facility].  They will not require cardiology evaluation prior to procedure.     2. Hold medications per surgery recommendation prior to procedure.    3. Cleared for surgery.        Tati Root, DO

## 2024-11-26 ENCOUNTER — OFFICE VISIT (OUTPATIENT)
Dept: FAMILY MEDICINE CLINIC | Age: 69
End: 2024-11-26

## 2024-11-26 VITALS
RESPIRATION RATE: 17 BRPM | HEART RATE: 95 BPM | TEMPERATURE: 97.8 F | BODY MASS INDEX: 22.07 KG/M2 | OXYGEN SATURATION: 97 % | DIASTOLIC BLOOD PRESSURE: 64 MMHG | WEIGHT: 113 LBS | SYSTOLIC BLOOD PRESSURE: 108 MMHG

## 2024-11-26 DIAGNOSIS — G89.18 POST-OPERATIVE PAIN: ICD-10-CM

## 2024-11-26 DIAGNOSIS — R05.8 OTHER COUGH: Primary | ICD-10-CM

## 2024-11-26 DIAGNOSIS — Z09 HOSPITAL DISCHARGE FOLLOW-UP: ICD-10-CM

## 2024-11-26 RX ORDER — ACETAMINOPHEN 500 MG
1000 TABLET ORAL EVERY 8 HOURS PRN
COMMUNITY
Start: 2024-11-22 | End: 2024-11-29

## 2024-11-26 RX ORDER — METHOCARBAMOL 750 MG/1
750 TABLET, FILM COATED ORAL EVERY 8 HOURS
Qty: 21 TABLET | Refills: 0 | Status: SHIPPED | OUTPATIENT
Start: 2024-11-26 | End: 2024-12-03

## 2024-11-26 RX ORDER — PANTOPRAZOLE SODIUM 40 MG/1
TABLET, DELAYED RELEASE ORAL
COMMUNITY
Start: 2024-11-23

## 2024-11-26 RX ORDER — BENZONATATE 100 MG/1
100 CAPSULE ORAL 3 TIMES DAILY PRN
Qty: 30 CAPSULE | Refills: 0 | Status: SHIPPED | OUTPATIENT
Start: 2024-11-26 | End: 2024-12-06

## 2024-11-26 RX ORDER — TRAMADOL HYDROCHLORIDE 50 MG/1
50 TABLET ORAL EVERY 6 HOURS PRN
COMMUNITY
Start: 2024-11-22 | End: 2024-11-29

## 2024-11-26 RX ORDER — METHOCARBAMOL 750 MG/1
750 TABLET, FILM COATED ORAL EVERY 8 HOURS
COMMUNITY
Start: 2024-11-22 | End: 2024-11-26 | Stop reason: SDUPTHER

## 2024-11-26 RX ORDER — POLYETHYLENE GLYCOL 3350 17 G/17G
17 POWDER, FOR SOLUTION ORAL DAILY
COMMUNITY
Start: 2024-11-22 | End: 2024-11-29

## 2024-11-26 RX ORDER — IBUPROFEN 100 MG/5ML
400 SUSPENSION ORAL EVERY 6 HOURS PRN
COMMUNITY
Start: 2024-11-22 | End: 2024-11-27

## 2024-11-26 NOTE — PROGRESS NOTES
Where to Get Your Medications        These medications were sent to [facility] [id number] - [place], [place] - [address] - P [phone] - F [phone]  [address] [facility] [address]      Phone: [phone]   benzonatate 100 MG capsule  methocarbamol 750 MG tablet           Medications marked \"taking\" at this time  Outpatient Medications Marked as Taking for the 11/26/24 encounter (Office Visit) with Tati Root DO   Medication Sig Dispense Refill    acetaminophen (TYLENOL) 500 MG tablet Take 2 tablets by mouth every 8 hours as needed      ibuprofen (ADVIL;MOTRIN) 100 MG/5ML suspension Take 20 mLs by mouth every 6 hours as needed      pantoprazole (PROTONIX) 40 MG tablet       benzonatate (TESSALON) 100 MG capsule Take 1 capsule by mouth 3 times daily as needed for Cough 30 capsule 0    methocarbamol (ROBAXIN) 750 MG tablet Take 1 tablet by mouth in the morning and 1 tablet at noon and 1 tablet in the evening. Do all this for 7 days. 21 tablet 0    carvedilol (COREG) 6.25 MG tablet Take 1 tablet by mouth 2 times daily (with meals) 60 tablet 2    amLODIPine (NORVASC) 5 MG tablet Take 1 tablet by mouth in the morning and at bedtime      Cholecalciferol (VITAMIN D3) 125 MCG (5000 UT) TABS Take by mouth          Medications patient taking as of now reconciled against medications ordered at time of hospital discharge: Yes        Objective:    /64   Pulse 95   Temp 97.8 °F (36.6 °C)   Resp 17   Wt 51.3 kg (113 lb)   LMP 06/25/2011   SpO2 97%   BMI 22.07 kg/m²       An electronic signature was used to authenticate this note.  --Tati Root DO

## 2024-12-14 ENCOUNTER — TELEPHONE (OUTPATIENT)
Dept: FAMILY MEDICINE CLINIC | Age: 69
End: 2024-12-14

## 2024-12-14 NOTE — TELEPHONE ENCOUNTER
Patient paged on call. States she is worried about her labs that were drawn by her oncologist/hematologies including Hgb and platelet count. Reviewed lab results with patient. In fact, both Hgb and platelet count are improving from 11/29/2024 hemoglobin was 9.4 and platelet count was 750, results from yesterday shows hgb 10.8 and platelet count 466  . 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 She states she gets anxious when her labs are not in the normal range. Pt thanked me for the advice. I encouraged her to keep close follow up with her oncologist.

## 2024-12-20 ENCOUNTER — CARE COORDINATION (OUTPATIENT)
Dept: CARE COORDINATION | Age: 69
End: 2024-12-20

## 2024-12-20 NOTE — CARE COORDINATION
Ambulatory Care Coordination Note     12/20/2024      Patient outreach attempt by this ACM today to offer care management services. ACM was unable to reach the patient by telephone today;   left voice message requesting a return phone call to this ACM.     ACM: Gloria Cole RN     PCP/Specialist follow up:       Follow Up:   Plan for next ACM outreach in approximately  3-4 days  to complete:  - outreach attempt to offer care management services.

## 2025-01-10 ENCOUNTER — CARE COORDINATION (OUTPATIENT)
Dept: CARE COORDINATION | Age: 70
End: 2025-01-10

## 2025-01-10 NOTE — CARE COORDINATION
Ambulatory Care Coordination Note     1/10/2025 3:54 PM     Patient Current Location:  Home: [address] [place] [id number]     This patient was received as a referral from Ambulatory Care Manager .    Interim ACM  contacted the patient by telephone. Verified name and  with patient as identifiers. Provided introduction to self, and explanation of the ACM role.   Patient respectfully declined care management services at this time.          ACM: Karol Forrester RN     Challenges to be reviewed by the provider   Additional needs identified to be addressed with provider No  none               Method of communication with provider: none.    Utilization: Patient has not had any utilization since our last call.     Care Summary Note: reviewed w/pt ACC support offered through PCP. Answered pt questions. Pt respectfully declines need to engage in ACC at this time, but verbalized appreciation for this outreach.  Extended pt open invitation to outreach for ACC support, in the future, as needed - simply per request direct to PCP.  Advised this writer acting as interim ACM and that each provider office has an assigned ACM available to engage for ACC support, as needed.  Pt thanked ACM for this information & maintains no further ACC outreach is needed.    Offered patient enrollment in the Remote Patient Monitoring (RPM) program for in-home monitoring: Patient is not eligible for RPM program because: declines ACC enrollment .     Assessments Completed:   General Assessment    Do you have any symptoms that are causing concern?: No          Medications Reviewed:   Not completed during this call: declines need to review    Advance Care Planning:   Declines further review     Care Planning:   No further ACC outreach planned per pt preference    PCP/Specialist follow up:   pt endorses intent to self outreach direct to PCP to schedule as appropriate    Follow Up:   No further ACC outreach planned per pt preference

## 2025-02-05 DIAGNOSIS — R00.0 TACHYCARDIA: ICD-10-CM

## 2025-02-05 DIAGNOSIS — I10 ESSENTIAL HYPERTENSION: ICD-10-CM

## 2025-02-05 NOTE — TELEPHONE ENCOUNTER
Patient needs a refill         carvedilol (COREG) 6.25 MG tablet     30 day  [facility]      No future appointments.

## 2025-02-06 RX ORDER — CARVEDILOL 6.25 MG/1
6.25 TABLET ORAL 2 TIMES DAILY WITH MEALS
Qty: 60 TABLET | Refills: 2 | Status: SHIPPED | OUTPATIENT
Start: 2025-02-06

## 2025-03-06 ENCOUNTER — OFFICE VISIT (OUTPATIENT)
Dept: FAMILY MEDICINE CLINIC | Age: 70
End: 2025-03-06

## 2025-03-06 VITALS
SYSTOLIC BLOOD PRESSURE: 128 MMHG | TEMPERATURE: 98.2 F | WEIGHT: 126 LBS | OXYGEN SATURATION: 99 % | HEART RATE: 93 BPM | BODY MASS INDEX: 24.61 KG/M2 | DIASTOLIC BLOOD PRESSURE: 86 MMHG | RESPIRATION RATE: 16 BRPM

## 2025-03-06 DIAGNOSIS — J40 BRONCHITIS: Primary | ICD-10-CM

## 2025-03-06 DIAGNOSIS — D44.7 PARAGANGLIOMA (HCC): ICD-10-CM

## 2025-03-06 RX ORDER — DEXTROMETHORPHAN HYDROBROMIDE AND PROMETHAZINE HYDROCHLORIDE 15; 6.25 MG/5ML; MG/5ML
5 SYRUP ORAL 4 TIMES DAILY PRN
Qty: 240 ML | Refills: 0 | Status: SHIPPED | OUTPATIENT
Start: 2025-03-06

## 2025-03-06 RX ORDER — GUAIFENESIN 600 MG/1
600 TABLET, EXTENDED RELEASE ORAL 2 TIMES DAILY
Qty: 30 TABLET | Refills: 0 | Status: SHIPPED | OUTPATIENT
Start: 2025-03-06 | End: 2025-03-20

## 2025-03-06 SDOH — ECONOMIC STABILITY: FOOD INSECURITY: WITHIN THE PAST 12 MONTHS, THE FOOD YOU BOUGHT JUST DIDN'T LAST AND YOU DIDN'T HAVE MONEY TO GET MORE.: NEVER TRUE

## 2025-03-06 SDOH — ECONOMIC STABILITY: FOOD INSECURITY: WITHIN THE PAST 12 MONTHS, YOU WORRIED THAT YOUR FOOD WOULD RUN OUT BEFORE YOU GOT MONEY TO BUY MORE.: NEVER TRUE

## 2025-03-06 ASSESSMENT — ENCOUNTER SYMPTOMS
CHOKING: 0
COUGH: 1
SHORTNESS OF BREATH: 0
CHEST TIGHTNESS: 0
EYES NEGATIVE: 1
WHEEZING: 0
GASTROINTESTINAL NEGATIVE: 1
STRIDOR: 0

## 2025-03-06 ASSESSMENT — PATIENT HEALTH QUESTIONNAIRE - PHQ9
1. LITTLE INTEREST OR PLEASURE IN DOING THINGS: NOT AT ALL
SUM OF ALL RESPONSES TO PHQ QUESTIONS 1-9: 0
2. FEELING DOWN, DEPRESSED OR HOPELESS: NOT AT ALL
SUM OF ALL RESPONSES TO PHQ QUESTIONS 1-9: 0

## 2025-03-06 NOTE — PROGRESS NOTES
3/6/2025    This is a 69 y.o. female   Chief Complaint   Patient presents with    Cough     Rattle in lung, hoarse voice, the cough is her trying to get whatever out of her lungs started a few days ago    .    Cough  Associated symptoms include postnasal drip. Pertinent negatives include no chills, fever, shortness of breath or wheezing. There is no history of environmental allergies.       History of Present Illness  The patient presents for evaluation of a persistent cough.    She has been experiencing this cough for several days, which she describes as a rattle in her upper respiratory tract. This symptom is causing significant distress as it is a new experience for her. She suspects it may be bronchial in nature, although initially she feared it could be pneumonia. Despite the cough, she reports feeling well overall and has not sought relief through medication. She does not have a fever. She also reports a recent single episode of spitting up yellow phlegm, which has since resolved. She is a non-smoker . She expresses concern about potential side effects of albuterol, including tachycardia and jitteriness. And does not want a breathing treatment. She has previously experienced adverse reactions to antihistamines, including chills and nausea, and therefore prefers not to use them. She has tolerated Phenergan in the past, although it induces fatigue.    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 She is currently under the care of an oncologist at [facility] and receives monthly injections of lanreotide, with the most recent dose administered on 02/24/2025. She has a history of kidney removal due to similar tumor development. She is scheduled for scans in mid-April 2025 and will follow up with her oncologist at the end of April 2025.    She occasionally takes pantoprazole for

## 2025-05-08 SDOH — HEALTH STABILITY: PHYSICAL HEALTH: ON AVERAGE, HOW MANY DAYS PER WEEK DO YOU ENGAGE IN MODERATE TO STRENUOUS EXERCISE (LIKE A BRISK WALK)?: 3 DAYS

## 2025-05-08 SDOH — ECONOMIC STABILITY: FOOD INSECURITY: WITHIN THE PAST 12 MONTHS, THE FOOD YOU BOUGHT JUST DIDN'T LAST AND YOU DIDN'T HAVE MONEY TO GET MORE.: NEVER TRUE

## 2025-05-08 SDOH — ECONOMIC STABILITY: INCOME INSECURITY: IN THE LAST 12 MONTHS, WAS THERE A TIME WHEN YOU WERE NOT ABLE TO PAY THE MORTGAGE OR RENT ON TIME?: NO

## 2025-05-08 SDOH — ECONOMIC STABILITY: TRANSPORTATION INSECURITY
IN THE PAST 12 MONTHS, HAS LACK OF TRANSPORTATION KEPT YOU FROM MEETINGS, WORK, OR FROM GETTING THINGS NEEDED FOR DAILY LIVING?: NO

## 2025-05-08 SDOH — ECONOMIC STABILITY: FOOD INSECURITY: WITHIN THE PAST 12 MONTHS, YOU WORRIED THAT YOUR FOOD WOULD RUN OUT BEFORE YOU GOT MONEY TO BUY MORE.: NEVER TRUE

## 2025-05-08 SDOH — ECONOMIC STABILITY: TRANSPORTATION INSECURITY
IN THE PAST 12 MONTHS, HAS THE LACK OF TRANSPORTATION KEPT YOU FROM MEDICAL APPOINTMENTS OR FROM GETTING MEDICATIONS?: NO

## 2025-05-08 SDOH — HEALTH STABILITY: PHYSICAL HEALTH: ON AVERAGE, HOW MANY MINUTES DO YOU ENGAGE IN EXERCISE AT THIS LEVEL?: 60 MIN

## 2025-05-08 ASSESSMENT — PATIENT HEALTH QUESTIONNAIRE - PHQ9
SUM OF ALL RESPONSES TO PHQ QUESTIONS 1-9: 0
1. LITTLE INTEREST OR PLEASURE IN DOING THINGS: NOT AT ALL
SUM OF ALL RESPONSES TO PHQ QUESTIONS 1-9: 0
SUM OF ALL RESPONSES TO PHQ QUESTIONS 1-9: 0
2. FEELING DOWN, DEPRESSED OR HOPELESS: NOT AT ALL
SUM OF ALL RESPONSES TO PHQ QUESTIONS 1-9: 0

## 2025-05-08 ASSESSMENT — LIFESTYLE VARIABLES
HOW MANY STANDARD DRINKS CONTAINING ALCOHOL DO YOU HAVE ON A TYPICAL DAY: PATIENT DOES NOT DRINK
HOW OFTEN DO YOU HAVE A DRINK CONTAINING ALCOHOL: NEVER

## 2025-05-08 ASSESSMENT — ANXIETY QUESTIONNAIRES
7. FEELING AFRAID AS IF SOMETHING AWFUL MIGHT HAPPEN: NOT AT ALL
2. NOT BEING ABLE TO STOP OR CONTROL WORRYING: NOT AT ALL
1. FEELING NERVOUS, ANXIOUS, OR ON EDGE: NOT AT ALL
6. BECOMING EASILY ANNOYED OR IRRITABLE: NOT AT ALL
3. WORRYING TOO MUCH ABOUT DIFFERENT THINGS: NOT AT ALL
5. BEING SO RESTLESS THAT IT IS HARD TO SIT STILL: NOT AT ALL
4. TROUBLE RELAXING: NOT AT ALL
GAD7 TOTAL SCORE: 0
IF YOU CHECKED OFF ANY PROBLEMS ON THIS QUESTIONNAIRE, HOW DIFFICULT HAVE THESE PROBLEMS MADE IT FOR YOU TO DO YOUR WORK, TAKE CARE OF THINGS AT HOME, OR GET ALONG WITH OTHER PEOPLE: NOT DIFFICULT AT ALL

## 2025-05-19 ASSESSMENT — ANXIETY QUESTIONNAIRES
2. NOT BEING ABLE TO STOP OR CONTROL WORRYING: NOT AT ALL
6. BECOMING EASILY ANNOYED OR IRRITABLE: NOT AT ALL
5. BEING SO RESTLESS THAT IT IS HARD TO SIT STILL: NOT AT ALL
3. WORRYING TOO MUCH ABOUT DIFFERENT THINGS: NOT AT ALL
1. FEELING NERVOUS, ANXIOUS, OR ON EDGE: NOT AT ALL
7. FEELING AFRAID AS IF SOMETHING AWFUL MIGHT HAPPEN: NOT AT ALL
IF YOU CHECKED OFF ANY PROBLEMS ON THIS QUESTIONNAIRE, HOW DIFFICULT HAVE THESE PROBLEMS MADE IT FOR YOU TO DO YOUR WORK, TAKE CARE OF THINGS AT HOME, OR GET ALONG WITH OTHER PEOPLE: NOT DIFFICULT AT ALL
4. TROUBLE RELAXING: NOT AT ALL

## 2025-05-19 ASSESSMENT — LIFESTYLE VARIABLES
HOW MANY STANDARD DRINKS CONTAINING ALCOHOL DO YOU HAVE ON A TYPICAL DAY: 0
HOW OFTEN DO YOU HAVE A DRINK CONTAINING ALCOHOL: 1
HOW OFTEN DO YOU HAVE SIX OR MORE DRINKS ON ONE OCCASION: 1

## 2025-05-19 ASSESSMENT — PATIENT HEALTH QUESTIONNAIRE - PHQ9
1. LITTLE INTEREST OR PLEASURE IN DOING THINGS: NOT AT ALL
2. FEELING DOWN, DEPRESSED OR HOPELESS: NOT AT ALL
SUM OF ALL RESPONSES TO PHQ9 QUESTIONS 1 & 2: 0

## 2025-06-20 ENCOUNTER — OFFICE VISIT (OUTPATIENT)
Dept: FAMILY MEDICINE CLINIC | Age: 70
End: 2025-06-20

## 2025-06-20 VITALS
TEMPERATURE: 97.5 F | SYSTOLIC BLOOD PRESSURE: 126 MMHG | HEIGHT: 60 IN | HEART RATE: 94 BPM | BODY MASS INDEX: 25.56 KG/M2 | DIASTOLIC BLOOD PRESSURE: 84 MMHG | RESPIRATION RATE: 16 BRPM | WEIGHT: 130.2 LBS | OXYGEN SATURATION: 97 %

## 2025-06-20 DIAGNOSIS — T45.1X5A CHEMOTHERAPY-INDUCED NAUSEA: ICD-10-CM

## 2025-06-20 DIAGNOSIS — Z78.0 POST-MENOPAUSAL: ICD-10-CM

## 2025-06-20 DIAGNOSIS — R00.0 TACHYCARDIA: ICD-10-CM

## 2025-06-20 DIAGNOSIS — Z00.00 MEDICARE ANNUAL WELLNESS VISIT, SUBSEQUENT: ICD-10-CM

## 2025-06-20 DIAGNOSIS — E11.69 TYPE 2 DIABETES MELLITUS WITH OTHER SPECIFIED COMPLICATION, WITHOUT LONG-TERM CURRENT USE OF INSULIN (HCC): Primary | ICD-10-CM

## 2025-06-20 DIAGNOSIS — E55.9 VITAMIN D INSUFFICIENCY: ICD-10-CM

## 2025-06-20 DIAGNOSIS — D44.7 PARAGANGLIOMA (HCC): ICD-10-CM

## 2025-06-20 DIAGNOSIS — R11.0 CHEMOTHERAPY-INDUCED NAUSEA: ICD-10-CM

## 2025-06-20 DIAGNOSIS — I10 ESSENTIAL HYPERTENSION: ICD-10-CM

## 2025-06-20 LAB
CHOLESTEROL, TOTAL: 231 MG/DL
HBA1C MFR BLD: 6.1 %
HDLC SERPL-MCNC: 68 MG/DL
LDL CHOLESTEROL: 135 MG/DL
NONHDLC SERPL-MCNC: 163 MG/DL
TRIGL SERPL-MCNC: 139 MG/DL
VITAMIN D 25-HYDROXY: 29.8 NG/ML (ref 30–150)

## 2025-06-20 RX ORDER — CARVEDILOL 6.25 MG/1
6.25 TABLET ORAL 2 TIMES DAILY WITH MEALS
Qty: 180 TABLET | Refills: 3 | Status: SHIPPED | OUTPATIENT
Start: 2025-06-20

## 2025-06-20 RX ORDER — ONDANSETRON 4 MG/1
4 TABLET, FILM COATED ORAL 3 TIMES DAILY PRN
Qty: 30 TABLET | Refills: 0 | Status: SHIPPED | OUTPATIENT
Start: 2025-06-20

## 2025-06-20 ASSESSMENT — PATIENT HEALTH QUESTIONNAIRE - PHQ9
SUM OF ALL RESPONSES TO PHQ QUESTIONS 1-9: 0
SUM OF ALL RESPONSES TO PHQ QUESTIONS 1-9: 0
2. FEELING DOWN, DEPRESSED OR HOPELESS: NOT AT ALL
SUM OF ALL RESPONSES TO PHQ QUESTIONS 1-9: 0
1. LITTLE INTEREST OR PLEASURE IN DOING THINGS: NOT AT ALL
SUM OF ALL RESPONSES TO PHQ QUESTIONS 1-9: 0

## 2025-06-24 ENCOUNTER — TELEPHONE (OUTPATIENT)
Dept: FAMILY MEDICINE CLINIC | Age: 70
End: 2025-06-24

## 2025-06-24 NOTE — TELEPHONE ENCOUNTER
Pt called for her lab results. She is concerned about the Vit D result. If you prescribe medication please send to [facility].

## 2025-06-27 ENCOUNTER — RESULTS FOLLOW-UP (OUTPATIENT)
Dept: FAMILY MEDICINE CLINIC | Age: 70
End: 2025-06-27

## 2025-08-22 ENCOUNTER — TELEPHONE (OUTPATIENT)
Dept: FAMILY MEDICINE CLINIC | Age: 70
End: 2025-08-22

## 2025-08-22 DIAGNOSIS — I15.9 SECONDARY HYPERTENSION: Primary | ICD-10-CM

## 2025-08-22 RX ORDER — LISINOPRIL 10 MG/1
10 TABLET ORAL DAILY
Qty: 90 TABLET | Refills: 1 | Status: SHIPPED | OUTPATIENT
Start: 2025-08-22

## 2025-08-28 RX ORDER — LISINOPRIL 5 MG/1
5 TABLET ORAL NIGHTLY
Qty: 90 TABLET | Refills: 0 | Status: SHIPPED | OUTPATIENT
Start: 2025-08-28

## 2025-09-05 ENCOUNTER — OFFICE VISIT (OUTPATIENT)
Dept: FAMILY MEDICINE CLINIC | Age: 70
End: 2025-09-05
Payer: MEDICARE

## 2025-09-05 VITALS
RESPIRATION RATE: 17 BRPM | OXYGEN SATURATION: 96 % | BODY MASS INDEX: 26.44 KG/M2 | WEIGHT: 135.4 LBS | DIASTOLIC BLOOD PRESSURE: 86 MMHG | HEART RATE: 86 BPM | TEMPERATURE: 97.2 F | SYSTOLIC BLOOD PRESSURE: 132 MMHG

## 2025-09-05 DIAGNOSIS — I10 ESSENTIAL HYPERTENSION: Primary | ICD-10-CM

## 2025-09-05 DIAGNOSIS — T45.1X5A CHEMOTHERAPY-INDUCED NAUSEA: ICD-10-CM

## 2025-09-05 DIAGNOSIS — R11.0 CHEMOTHERAPY-INDUCED NAUSEA: ICD-10-CM

## 2025-09-05 PROCEDURE — 3017F COLORECTAL CA SCREEN DOC REV: CPT | Performed by: SURGERY

## 2025-09-05 PROCEDURE — 1123F ACP DISCUSS/DSCN MKR DOCD: CPT | Performed by: SURGERY

## 2025-09-05 PROCEDURE — 1159F MED LIST DOCD IN RCRD: CPT | Performed by: SURGERY

## 2025-09-05 PROCEDURE — G8427 DOCREV CUR MEDS BY ELIG CLIN: HCPCS | Performed by: SURGERY

## 2025-09-05 PROCEDURE — 1090F PRES/ABSN URINE INCON ASSESS: CPT | Performed by: SURGERY

## 2025-09-05 PROCEDURE — G8400 PT W/DXA NO RESULTS DOC: HCPCS | Performed by: SURGERY

## 2025-09-05 PROCEDURE — 99214 OFFICE O/P EST MOD 30 MIN: CPT | Performed by: SURGERY

## 2025-09-05 PROCEDURE — 1036F TOBACCO NON-USER: CPT | Performed by: SURGERY

## 2025-09-05 PROCEDURE — 3075F SYST BP GE 130 - 139MM HG: CPT | Performed by: SURGERY

## 2025-09-05 PROCEDURE — 3079F DIAST BP 80-89 MM HG: CPT | Performed by: SURGERY

## 2025-09-05 PROCEDURE — G8419 CALC BMI OUT NRM PARAM NOF/U: HCPCS | Performed by: SURGERY

## 2025-09-05 RX ORDER — PANTOPRAZOLE SODIUM 20 MG/1
20 TABLET, DELAYED RELEASE ORAL DAILY
COMMUNITY
Start: 2025-09-02 | End: 2025-09-05

## 2025-09-05 RX ORDER — ONDANSETRON 4 MG/1
4 TABLET, FILM COATED ORAL 3 TIMES DAILY PRN
Qty: 30 TABLET | Refills: 0 | Status: SHIPPED | OUTPATIENT
Start: 2025-09-05

## (undated) DEVICE — SUTURE VCRL + SZ 3-0 L18IN ABSRB UD SH 1/2 CIR TAPERCUT NDL VCP864D

## (undated) DEVICE — TOTAL TRAY, DB, 100% SILI FOLEY, 16FR 10: Brand: MEDLINE

## (undated) DEVICE — SYSTEM SMK EVAC LAP TBNG FILTER HSNG BENT STYL PNK SEE CLR

## (undated) DEVICE — SEAL

## (undated) DEVICE — GLOVE,SURG,SENSICARE SLT,LF,PF,7: Brand: MEDLINE

## (undated) DEVICE — GAUZE,SPONGE,2"X2",8PLY,STERILE,LF,2'S: Brand: MEDLINE

## (undated) DEVICE — 35 ML SYRINGE LUER-LOCK TIP: Brand: MONOJECT

## (undated) DEVICE — TISSUE RETRIEVAL SYSTEM: Brand: INZII RETRIEVAL SYSTEM

## (undated) DEVICE — 3M™ TEGADERM™ TRANSPARENT FILM DRESSING FRAME STYLE, 1624W, 2-3/8 IN X 2-3/4 IN (6 CM X 7 CM), 100/CT 4CT/CASE: Brand: 3M™ TEGADERM™

## (undated) DEVICE — 3M™ STERI-STRIP™ REINFORCED ADHESIVE SKIN CLOSURES, R1547, 1/2 IN X 4 IN (12 MM X 100 MM), 6 STRIPS/ENVELOPE: Brand: 3M™ STERI-STRIP™

## (undated) DEVICE — SUTURE PDS II SZ 1 L36IN ABSRB VLT CT L40MM 1/2 CIR TAPR Z359T

## (undated) DEVICE — 3M™ STERI-STRIP™ COMPOUND BENZOIN TINCTURE 40 BAGS/CARTON 4 CARTONS/CASE C1544: Brand: 3M™ STERI-STRIP™

## (undated) DEVICE — STAPLER SHEATH: Brand: ENDOWRIST

## (undated) DEVICE — BLADELESS OBTURATOR: Brand: WECK VISTA

## (undated) DEVICE — CUTTER ENDOSCP L340MM LIN ARTC SGL STROKE FIRING ENDOPATH

## (undated) DEVICE — SUTURE MCRYL SZ 4-0 L18IN ABSRB UD L19MM PS-2 3/8 CIR PRIM Y496G

## (undated) DEVICE — ARM DRAPE

## (undated) DEVICE — COLUMN DRAPE

## (undated) DEVICE — PUMP SUC IRR TBNG L10FT W/ HNDPC ASSEMB STRYKEFLOW 2

## (undated) DEVICE — PENCIL ES CRD L10FT HND SWCHING ROCK SWCH W/ EDGE COAT BLDE

## (undated) DEVICE — INSUFFLATION NEEDLE TO ESTABLISH PNEUMOPERITONEUM.: Brand: INSUFFLATION NEEDLE

## (undated) DEVICE — SCISSORS SURG DIA8MM MPLR CRV ENDOWRIST

## (undated) DEVICE — SOLUTION IV IRRIG WATER 1000ML POUR BRL 2F7114

## (undated) DEVICE — PROGRASP FORCEPS: Brand: ENDOWRIST

## (undated) DEVICE — Device

## (undated) DEVICE — TIP COVER ACCESSORY

## (undated) DEVICE — MARYLAND BIPOLAR FORCEPS: Brand: ENDOWRIST

## (undated) DEVICE — CANNULA SEAL

## (undated) DEVICE — GOWN SIRUS NONREIN XL W/TWL: Brand: MEDLINE INDUSTRIES, INC.

## (undated) DEVICE — CLIP INT L POLYMER LOK LIG HEM O LOK

## (undated) DEVICE — STAPLER EXT SKIN 35 WIDE S STL STPL SQUEEZE HNDL VISISTAT

## (undated) DEVICE — 3M™ IOBAN™ 2 ANTIMICROBIAL INCISE DRAPE 6650EZ: Brand: IOBAN™ 2

## (undated) DEVICE — SUTURE MCRYL + SZ 4-0 L18IN ABSRB UD L19MM PS-2 3/8 CIR MCP496G

## (undated) DEVICE — GLOVE ORANGE PI 7   MSG9070

## (undated) DEVICE — RELOAD STPL H1-2.5X45MM VASC THN TISS WHT 6 ROW B FRM SGL

## (undated) DEVICE — GLOVE ORANGE PI 7 1/2   MSG9075

## (undated) DEVICE — SPONGE: LAP 4X18 W XR 200/CS: Brand: MEDICAL ACTION INDUSTRIES